# Patient Record
Sex: FEMALE | Race: WHITE | Employment: OTHER | URBAN - METROPOLITAN AREA
[De-identification: names, ages, dates, MRNs, and addresses within clinical notes are randomized per-mention and may not be internally consistent; named-entity substitution may affect disease eponyms.]

---

## 2020-10-16 ENCOUNTER — HOSPITAL ENCOUNTER (INPATIENT)
Dept: CARDIAC CATH/INVASIVE PROCEDURES | Age: 85
LOS: 6 days | Discharge: HOME | DRG: 215 | End: 2020-10-22
Attending: INTERNAL MEDICINE | Admitting: INTERNAL MEDICINE
Payer: MEDICARE

## 2020-10-16 PROBLEM — I21.4 NSTEMI (NON-ST ELEVATED MYOCARDIAL INFARCTION) (HCC): Status: ACTIVE | Noted: 2020-10-16

## 2020-10-16 LAB
HCT VFR BLD CALC: 41.1 % (ref 36–48)
HEMOGLOBIN: 13.9 G/DL (ref 12–16)
MCH RBC QN AUTO: 30.8 PG (ref 26–34)
MCHC RBC AUTO-ENTMCNC: 33.8 G/DL (ref 31–36)
MCV RBC AUTO: 91.2 FL (ref 80–100)
PDW BLD-RTO: 12.9 % (ref 12.4–15.4)
PLATELET # BLD: 180 K/UL (ref 135–450)
PMV BLD AUTO: 8.5 FL (ref 5–10.5)
RBC # BLD: 4.51 M/UL (ref 4–5.2)
WBC # BLD: 8.7 K/UL (ref 4–11)

## 2020-10-16 PROCEDURE — 6370000000 HC RX 637 (ALT 250 FOR IP): Performed by: INTERNAL MEDICINE

## 2020-10-16 PROCEDURE — 2580000003 HC RX 258: Performed by: INTERNAL MEDICINE

## 2020-10-16 PROCEDURE — 2060000000 HC ICU INTERMEDIATE R&B

## 2020-10-16 PROCEDURE — 6360000002 HC RX W HCPCS

## 2020-10-16 PROCEDURE — 2500000003 HC RX 250 WO HCPCS: Performed by: INTERNAL MEDICINE

## 2020-10-16 PROCEDURE — 2500000003 HC RX 250 WO HCPCS

## 2020-10-16 PROCEDURE — B41D1ZZ FLUOROSCOPY OF AORTA AND BILATERAL LOWER EXTREMITY ARTERIES USING LOW OSMOLAR CONTRAST: ICD-10-PCS | Performed by: INTERNAL MEDICINE

## 2020-10-16 PROCEDURE — 6360000004 HC RX CONTRAST MEDICATION: Performed by: INTERNAL MEDICINE

## 2020-10-16 PROCEDURE — 93454 CORONARY ARTERY ANGIO S&I: CPT

## 2020-10-16 PROCEDURE — B2111ZZ FLUOROSCOPY OF MULTIPLE CORONARY ARTERIES USING LOW OSMOLAR CONTRAST: ICD-10-PCS | Performed by: INTERNAL MEDICINE

## 2020-10-16 PROCEDURE — C1894 INTRO/SHEATH, NON-LASER: HCPCS

## 2020-10-16 PROCEDURE — 85027 COMPLETE CBC AUTOMATED: CPT

## 2020-10-16 PROCEDURE — B2131ZZ FLUOROSCOPY OF MULTIPLE CORONARY ARTERY BYPASS GRAFTS USING LOW OSMOLAR CONTRAST: ICD-10-PCS | Performed by: INTERNAL MEDICINE

## 2020-10-16 PROCEDURE — 6360000002 HC RX W HCPCS: Performed by: INTERNAL MEDICINE

## 2020-10-16 PROCEDURE — 2580000003 HC RX 258

## 2020-10-16 PROCEDURE — 36415 COLL VENOUS BLD VENIPUNCTURE: CPT

## 2020-10-16 PROCEDURE — 99153 MOD SED SAME PHYS/QHP EA: CPT

## 2020-10-16 PROCEDURE — C1769 GUIDE WIRE: HCPCS

## 2020-10-16 PROCEDURE — 75625 CONTRAST EXAM ABDOMINL AORTA: CPT | Performed by: INTERNAL MEDICINE

## 2020-10-16 PROCEDURE — 2709999900 HC NON-CHARGEABLE SUPPLY

## 2020-10-16 PROCEDURE — 93455 CORONARY ART/GRFT ANGIO S&I: CPT | Performed by: INTERNAL MEDICINE

## 2020-10-16 PROCEDURE — 75625 CONTRAST EXAM ABDOMINL AORTA: CPT

## 2020-10-16 PROCEDURE — 99152 MOD SED SAME PHYS/QHP 5/>YRS: CPT

## 2020-10-16 RX ORDER — ONDANSETRON 2 MG/ML
4 INJECTION INTRAMUSCULAR; INTRAVENOUS EVERY 6 HOURS PRN
Status: DISCONTINUED | OUTPATIENT
Start: 2020-10-16 | End: 2020-10-22 | Stop reason: HOSPADM

## 2020-10-16 RX ORDER — ASPIRIN 81 MG/1
81 TABLET, CHEWABLE ORAL DAILY
Status: DISCONTINUED | OUTPATIENT
Start: 2020-10-17 | End: 2020-10-22 | Stop reason: HOSPADM

## 2020-10-16 RX ORDER — SODIUM CHLORIDE 9 MG/ML
INJECTION, SOLUTION INTRAVENOUS CONTINUOUS
Status: ACTIVE | OUTPATIENT
Start: 2020-10-16 | End: 2020-10-17

## 2020-10-16 RX ORDER — ATORVASTATIN CALCIUM 80 MG/1
80 TABLET, FILM COATED ORAL NIGHTLY
Status: DISCONTINUED | OUTPATIENT
Start: 2020-10-16 | End: 2020-10-17

## 2020-10-16 RX ORDER — SODIUM CHLORIDE 0.9 % (FLUSH) 0.9 %
10 SYRINGE (ML) INJECTION PRN
Status: DISCONTINUED | OUTPATIENT
Start: 2020-10-16 | End: 2020-10-21 | Stop reason: SDUPTHER

## 2020-10-16 RX ORDER — AMLODIPINE BESYLATE 5 MG/1
5 TABLET ORAL DAILY
Status: DISCONTINUED | OUTPATIENT
Start: 2020-10-17 | End: 2020-10-22 | Stop reason: HOSPADM

## 2020-10-16 RX ORDER — LORAZEPAM 1 MG/1
1 TABLET ORAL EVERY 6 HOURS PRN
Status: DISCONTINUED | OUTPATIENT
Start: 2020-10-16 | End: 2020-10-22 | Stop reason: HOSPADM

## 2020-10-16 RX ORDER — HEPARIN SODIUM 1000 [USP'U]/ML
30 INJECTION, SOLUTION INTRAVENOUS; SUBCUTANEOUS PRN
Status: DISCONTINUED | OUTPATIENT
Start: 2020-10-16 | End: 2020-10-16

## 2020-10-16 RX ORDER — ACETAMINOPHEN 325 MG/1
650 TABLET ORAL EVERY 4 HOURS PRN
Status: DISCONTINUED | OUTPATIENT
Start: 2020-10-16 | End: 2020-10-21 | Stop reason: SDUPTHER

## 2020-10-16 RX ORDER — SODIUM CHLORIDE 0.9 % (FLUSH) 0.9 %
10 SYRINGE (ML) INJECTION EVERY 12 HOURS SCHEDULED
Status: DISCONTINUED | OUTPATIENT
Start: 2020-10-16 | End: 2020-10-21 | Stop reason: SDUPTHER

## 2020-10-16 RX ORDER — HEPARIN SODIUM 10000 [USP'U]/100ML
6 INJECTION, SOLUTION INTRAVENOUS CONTINUOUS
Status: DISCONTINUED | OUTPATIENT
Start: 2020-10-16 | End: 2020-10-17

## 2020-10-16 RX ORDER — HEPARIN SODIUM 1000 [USP'U]/ML
60 INJECTION, SOLUTION INTRAVENOUS; SUBCUTANEOUS PRN
Status: DISCONTINUED | OUTPATIENT
Start: 2020-10-16 | End: 2020-10-16

## 2020-10-16 RX ORDER — METOPROLOL SUCCINATE 25 MG/1
25 TABLET, EXTENDED RELEASE ORAL DAILY
Status: DISCONTINUED | OUTPATIENT
Start: 2020-10-16 | End: 2020-10-19

## 2020-10-16 RX ORDER — ATORVASTATIN CALCIUM 10 MG/1
10 TABLET, FILM COATED ORAL NIGHTLY
Status: DISCONTINUED | OUTPATIENT
Start: 2020-10-16 | End: 2020-10-16

## 2020-10-16 RX ADMIN — LORAZEPAM 1 MG: 1 TABLET ORAL at 22:23

## 2020-10-16 RX ADMIN — METOPROLOL SUCCINATE 25 MG: 25 TABLET, EXTENDED RELEASE ORAL at 16:54

## 2020-10-16 RX ADMIN — ATORVASTATIN CALCIUM 80 MG: 80 TABLET, FILM COATED ORAL at 22:23

## 2020-10-16 RX ADMIN — SODIUM CHLORIDE: 9 INJECTION, SOLUTION INTRAVENOUS at 16:43

## 2020-10-16 RX ADMIN — TICAGRELOR 180 MG: 90 TABLET ORAL at 16:54

## 2020-10-16 RX ADMIN — ONDANSETRON 4 MG: 2 INJECTION INTRAMUSCULAR; INTRAVENOUS at 17:53

## 2020-10-16 RX ADMIN — IOPAMIDOL 115 ML: 755 INJECTION, SOLUTION INTRAVENOUS at 16:08

## 2020-10-16 RX ADMIN — HEPARIN SODIUM 6 ML/HR: 10000 INJECTION, SOLUTION INTRAVENOUS at 16:55

## 2020-10-16 ASSESSMENT — PAIN SCALES - GENERAL: PAINLEVEL_OUTOF10: 0

## 2020-10-16 NOTE — PROGRESS NOTES
Per GRACIELA De La Paz, Dr. Katie Middleton returned call and asked to reassess site and update him at 2115.

## 2020-10-16 NOTE — PROGRESS NOTES
Clinical Pharmacy Note  Heparin Dosing Consult    Noemí Gonzalez is a 81 y.o. female ordered heparin per low dose nomogram by Dr. Katie Middleton. No results found for: APTT  No results found for: HGB, HCT, PLT, INR    Ht Readings from Last 1 Encounters:   10/16/20 5' 2\" (1.575 m)        Wt Readings from Last 1 Encounters:   10/16/20 110 lb 7.2 oz (50.1 kg)     Dosing weight: 50.1 kg    Assessment/Plan:  No Initial bolus  Initial infusion rate: 6 mL/hr  Next aPTT: 2230    Pharmacy will continue to monitor adjust heparin based on aPTT results using nomogram below:     LOW DOSE HEPARIN PROTOCOL (ACS/STEMI/A FIB)     Initial Bolus: 60 units/kg Max Bolus: 4,000 units       Initial Rate: 12 units/kg/hr Max Initial Rate: 1,000 units/hr     aPTT < 36   Heparin 60 units/kg bolus Increase infusion by 4 units/kg/hr       (maximum 4,000 units)   aPTT 37-48   Heparin 30 units/kg bolus Increase infusion by 2 units/kg/hr       (maximum 2,000 units)   aPTT 49-76   No bolus   No change   aPTT 77-85   No bolus   Decrease infusion by 2 units/kg/hr   aPTT 86-94   Hold heparin for 1 hour Decrease infusion by 3 units/kg/hr   aPTT     Hold heparin for 1 hour Decrease infusion by 4 units/kg/hr   aPTT > 103   Hold heparin for 1 hour Decrease infusion by 6 units/kg/hr    Obtain aPTT 6 hours after initial bolus and 6 hours after any dose change until two consecutive therapeutic aPTTs are achieved - then daily.   Teodoro Tejeda RPh 10/16/2020 4:24 PM

## 2020-10-16 NOTE — PROGRESS NOTES
Patient admitted to  [id number]. Admission and assessment completed and charted. VSS. NO s/s distress. Bed Alarm in place.  Light in reach

## 2020-10-16 NOTE — PROGRESS NOTES
Patients TR band complete- removed-  Patient immediately began bleeding- manual pressure held- patients left hand began to swell and significant bruising-  Pulses continued to be intact. Dr Dale Padilla on unit- in to see patient. Heparin drip held. Manual pressure continued- MD placed ace wrap - ice applied. Patient denies any pain at this time.

## 2020-10-16 NOTE — OP NOTE
[address]   Procedure Note    CLINICAL HISTORY:       Noemí Gonzalez is a 81 y.o. female with a history of CABG. She was admitted with complaints of chest pain. Cardiac markers were positive. EKG showed nonspecific ST-T wave abnormality. The risks, benefits, and details of the procedure were explained to the patient. The patient verbalized understanding and wanted to proceed. Informed written consent was obtained. Prior Procedures/Dates:  PTCA: no  CABG: yes    ETT/Stress: no  Echo: yes    Ischemia yes   Other significant clinical information:  Dye Allergy no  Coumadin no  Metformin  no  Creatinine >1.5 no     PROCEDURE TECHNIQUE:  The patient was approached from the left  radial artery using a 5-6  [place] slender sheath. Left coronary angiography was done using a Hermelinda L4 diagnostic catheter. Right coronary angiography was done using a Hermelinda R4 guide catheter. LIMA/MP/LCB catheters used for bypass grafts. Pigtail catheter used for abdominal aortogram.      CONTRAST:  Total of 115 cc. COMPLICATIONS:  None. At the end of the procedure a TR device was used for hemostasis. EBL: 5 cc    HEMODYNAMICS:  Aortic pressure was 120/80    ANGIOGRAM/CORONARY ARTERIOGRAM:       The left main coronary artery is severely calcified, with prox and distal 90% lesion . The left anterior descending artery is moderate diffuse disease. The left circumflex artery is 100% occluded     The right coronary artery is 100% occluded     SVG to PDA patent   SVG to OM1 patent   LIMA is atretic     Abdominal aortogram:    Descending aortic aneurysm  Tortuous common iliac arteries bilaterally      INTERVENTION  1.  Non     SUMMARY:   Severe LM stenosis   SVG to PDA/OM1 patent       RECOMMENDATION:      Needs unprotected left main intervention   Plan for rota/impella PCI next week ( tentatively planned for Wednesday 7:30 AM)  Will allow for creatinine to stabilize an obtain CTA chest to measure axillary artery

## 2020-10-16 NOTE — PROGRESS NOTES
Per MD - ace wrap removed after 30 minutes     Significant brusing continues from forearm throughout wrist and hand - hand remains taunt and swelling- forearm soft- pulses intact - palm refill less than 3 seconds       Ace wrap reapplied - ice packs applied- left hand and forearm placed to elevation with pillows     Call placed to Dr Tamika Jay

## 2020-10-16 NOTE — H&P
Cardiology Consultation   Referring Physician: Dr. Raine Tim   Reason for Consultation: NSTEMI   Chief Complaint: jaw pain       Subjective:   History of Present Illness:     Yohan Parekh is a 81 y.o. female with significant history of CABG, TAA repair, HTN, HL who  presents with the above complaint. Episode of severe jaw pain  2 nights ago. Sudden onset, no alleviating or exacerbating factors. Presented to OSH for workup and troponin was > 3. Currently she denies chest pain, PND, orthopnea, dyspnea at rest, palpitations, syncope or edema. Past Medical History:   has a past medical history of GERD (gastroesophageal reflux disease), Hyperlipidemia, Hypertension, and Irritable bowel syndrome. Surgical History:   has a past surgical history that includes Hysterectomy; Thoracic aortic aneurysm repair; Coronary artery bypass graft; and Cholecystectomy. Social History:   reports that she quit smoking about 24 years ago. Her smoking use included cigarettes. She has a 10.00 pack-year smoking history. She has never used smokeless tobacco. She reports current alcohol use. Family History:  family history includes Heart Disease in her father and mother; High Blood Pressure in her mother; Stroke in her mother. Home Medications:  Were reviewed and are listed in nursing record and/or below  Prior to Admission medications    Medication Sig Start Date End Date Taking? Authorizing Provider   lisinopril (PRINIVIL;ZESTRIL) 5 MG tablet Take 1 tablet by mouth daily 5/15/20  Yes Alex Claudio MD   amLODIPine (NORVASC) 5 MG tablet Take 1 tablet by mouth daily 3/28/18  Yes Alex Claudio MD   simvastatin (ZOCOR) 80 MG tablet Take 80 mg by mouth nightly. Yes Historical Provider, MD   LORazepam (ATIVAN) 1 MG tablet Take 1 mg by mouth every 6 hours as needed for Anxiety. Yes Historical Provider, MD   omeprazole (PRILOSEC) 20 MG capsule Take 40 mg by mouth daily.    Yes Historical Provider, MD clidinium-chlordiazePOXIDE (LIBRAX) 2.5-5 MG per capsule Take 1 capsule by mouth 4 times daily (before meals and nightly). Yes Historical Provider, MD   Calcium Carbonate (CALTRATE 600 PO) Take  by mouth. Yes Historical Provider, MD   Multiple Vitamins-Minerals (THERAPEUTIC MULTIVITAMIN-MINERALS) tablet Take 1 tablet by mouth daily. Yes Historical Provider, MD   aspirin 81 MG tablet Take 81 mg by mouth daily. Yes Historical Provider, MD   levobunolol (BETAGAN) 0.5 % ophthalmic solution 1 drop nightly. Yes Historical Provider, MD   fluticasone [facility]) 50 MCG/ACT nasal spray 2 sprays by Nasal route daily 2 sprays each side once/day 1/14/16   Jyotsna Vasquez MD          Allergies:  Amoxil [amoxicillin]     Review of Systems:   · Constitutional: no unanticipated weight loss. There's been no change in energy level, sleep pattern, or activity level. No fevers, chills. · Eyes: No visual changes or diplopia. No scleral icterus. · ENT: No Headaches, hearing loss or vertigo. No mouth sores or sore throat. · Cardiovascular: as reviewed in HPI  · Respiratory: No cough or wheezing, no sputum production. No hemoptysis. · Gastrointestinal: No abdominal pain, appetite loss, blood in stools. No change in bowel or bladder habits. · Genitourinary: No dysuria, trouble voiding, or hematuria. · Musculoskeletal:  No gait disturbance, no joint complaints. · Integumentary: No rash or pruritis. · Neurological: No headache, diplopia, change in muscle strength, numbness or tingling. · Psychiatric: No anxiety or depression. · Endocrine: No temperature intolerance. No excessive thirst, fluid intake, or urination. No tremor. · Hematologic/Lymphatic: No abnormal bruising or bleeding, blood clots or swollen lymph nodes. · Allergic/Immunologic: No nasal congestion or hives. Objective:   PHYSICAL EXAM:    There were no vitals filed for this visit.          General Appearance:  Alert, cooperative, no distress, appears stated age. Head:  Normocephalic, without obvious abnormality, atraumatic. Eyes:  Pupils equal and round. No scleral icterus. Mouth: Moist mucosa, no pharyngeal erythema. Nose: Nares normal. No drainage or sinus tenderness. Neck: Supple, symmetrical, trachea midline. No adenopathy. No tenderness/mass/nodules. No carotid bruit or elevated JVD. Lungs:   Clear to auscultation bilaterally, respirations unlabored. No wheeze, rales, or rhonchi. Chest Wall:  No tenderness or deformity. Heart:  Regular rate. S1/S2 normal. No murmur, rub, or gallop. Abdomen:   Soft, non-tender, bowel sounds active. Musculoskeletal: No muscle wasting or digital clubbing. Extremities: Extremities normal, atraumatic. No cyanosis or edema. Pulses: 2+ radial and carotid pulses, symmetric. Skin: No rashes or lesions. Pysch: Normal mood and affect. Alert and oriented x 4. Neurologic: Normal gross motor and sensory exam.       Labs     CBC: No results found for: WBC, RBC, HGB, HCT, MCV, RDW, PLT  CMP:No results found for: NA, K, CL, CO2, BUN, CREATININE, GFRAA, AGRATIO, LABGLOM, GLUCOSE, PROT, CALCIUM, BILITOT, ALKPHOS, AST, ALT  PT/INR:  No results found for: PTINR  HgBA1c:No results found for: LABA1C  @RESUFAST(CKTOTAL,CKMB,CKMBINDEX,TROPONINI      CURRENT Medications:  No current facility-administered medications for this encounter. Cardiac testing     EKG:     Echo:   @ [facility] normal LV function     Stress Test:     Cath: All above diagnostic testing was independently visualized and reviewed by me (not simply review of report)     Patient Active Problem List   Diagnosis    NSTEMI (non-ST elevated myocardial infarction) ([facility] [address]         Assessment and Plan   1) NSTEMI  - [facility] today   - further recommendations pending results of above     Thank you for allowing us to participate in the care of [facility].     Amando Jones MD [address] [facility],  Interventional Cardiology, and Peripheral Vascular Disease

## 2020-10-17 LAB
ANION GAP SERPL CALCULATED.3IONS-SCNC: 11 MMOL/L (ref 3–16)
BUN BLDV-MCNC: 13 MG/DL (ref 7–20)
CALCIUM SERPL-MCNC: 8.9 MG/DL (ref 8.3–10.6)
CHLORIDE BLD-SCNC: 103 MMOL/L (ref 99–110)
CO2: 24 MMOL/L (ref 21–32)
CREAT SERPL-MCNC: 0.7 MG/DL (ref 0.6–1.2)
GFR AFRICAN AMERICAN: >60
GFR NON-AFRICAN AMERICAN: >60
GLUCOSE BLD-MCNC: 96 MG/DL (ref 70–99)
HCT VFR BLD CALC: 37.2 % (ref 36–48)
HEMOGLOBIN: 12.5 G/DL (ref 12–16)
LV EF: 70 %
LVEF MODALITY: NORMAL
POTASSIUM SERPL-SCNC: 3.5 MMOL/L (ref 3.5–5.1)
SODIUM BLD-SCNC: 138 MMOL/L (ref 136–145)

## 2020-10-17 PROCEDURE — 6370000000 HC RX 637 (ALT 250 FOR IP): Performed by: INTERNAL MEDICINE

## 2020-10-17 PROCEDURE — 85014 HEMATOCRIT: CPT

## 2020-10-17 PROCEDURE — 94760 N-INVAS EAR/PLS OXIMETRY 1: CPT

## 2020-10-17 PROCEDURE — 80048 BASIC METABOLIC PNL TOTAL CA: CPT

## 2020-10-17 PROCEDURE — 2580000003 HC RX 258: Performed by: INTERNAL MEDICINE

## 2020-10-17 PROCEDURE — 85018 HEMOGLOBIN: CPT

## 2020-10-17 PROCEDURE — 2060000000 HC ICU INTERMEDIATE R&B

## 2020-10-17 PROCEDURE — 93306 TTE W/DOPPLER COMPLETE: CPT

## 2020-10-17 PROCEDURE — 99233 SBSQ HOSP IP/OBS HIGH 50: CPT | Performed by: INTERNAL MEDICINE

## 2020-10-17 PROCEDURE — 36415 COLL VENOUS BLD VENIPUNCTURE: CPT

## 2020-10-17 RX ORDER — LEVOBUNOLOL HYDROCHLORIDE 5 MG/ML
1 SOLUTION/ DROPS OPHTHALMIC NIGHTLY
Status: DISCONTINUED | OUTPATIENT
Start: 2020-10-17 | End: 2020-10-17 | Stop reason: CLARIF

## 2020-10-17 RX ORDER — TIMOLOL MALEATE 5 MG/ML
1 SOLUTION/ DROPS OPHTHALMIC 2 TIMES DAILY
Status: DISCONTINUED | OUTPATIENT
Start: 2020-10-17 | End: 2020-10-22 | Stop reason: HOSPADM

## 2020-10-17 RX ORDER — ATORVASTATIN CALCIUM 10 MG/1
10 TABLET, FILM COATED ORAL NIGHTLY
Status: DISCONTINUED | OUTPATIENT
Start: 2020-10-17 | End: 2020-10-21

## 2020-10-17 RX ORDER — CLOPIDOGREL BISULFATE 75 MG/1
75 TABLET ORAL DAILY
Status: DISCONTINUED | OUTPATIENT
Start: 2020-10-17 | End: 2020-10-22 | Stop reason: HOSPADM

## 2020-10-17 RX ADMIN — TICAGRELOR 90 MG: 90 TABLET ORAL at 09:20

## 2020-10-17 RX ADMIN — CLOPIDOGREL BISULFATE 75 MG: 75 TABLET ORAL at 18:46

## 2020-10-17 RX ADMIN — ATORVASTATIN CALCIUM 10 MG: 10 TABLET, FILM COATED ORAL at 21:04

## 2020-10-17 RX ADMIN — SODIUM CHLORIDE, PRESERVATIVE FREE 10 ML: 5 INJECTION INTRAVENOUS at 21:06

## 2020-10-17 RX ADMIN — LORAZEPAM 1 MG: 1 TABLET ORAL at 22:09

## 2020-10-17 RX ADMIN — TIMOLOL MALEATE 1 DROP: 5 SOLUTION/ DROPS OPHTHALMIC at 21:04

## 2020-10-17 RX ADMIN — METOPROLOL SUCCINATE 25 MG: 25 TABLET, EXTENDED RELEASE ORAL at 09:20

## 2020-10-17 RX ADMIN — SODIUM CHLORIDE: 9 INJECTION, SOLUTION INTRAVENOUS at 02:02

## 2020-10-17 RX ADMIN — ASPIRIN 81 MG: 81 TABLET, CHEWABLE ORAL at 09:20

## 2020-10-17 RX ADMIN — AMLODIPINE BESYLATE 5 MG: 5 TABLET ORAL at 09:20

## 2020-10-17 ASSESSMENT — PAIN SCALES - GENERAL: PAINLEVEL_OUTOF10: 0

## 2020-10-17 NOTE — PROGRESS NOTES
Left site reassessed, no significant change. Small amount of new blood on dressing. Significant bruising still present throughout wrist and hand, hand still taunt from swelling, forearm remains soft, pulses intact, palm refill less than 3 seconds, fingertip refill less than 3 seconds. Ice pack and ace wrap reapplied, elevated on 3 pillows. Called placed to Dr. Iqra Herndon.

## 2020-10-17 NOTE — PROGRESS NOTES
Cardiology progress note      HPI  60-year-old female with previous CABG, TAA repair, HTN, HLD who was admitted with severe jaw pain 2-3 nights ago to [facility]. She ruled in for non-STEMI with a troponin of 3. She was transferred here for further evaluation    Interval history  Underwent diagnostic angiography to the left radial artery  Now has a large hematoma  Hemostasis secured  But arm is swollen and bruised    No chest pain          Past Medical History:   has a past medical history of GERD (gastroesophageal reflux disease), Hyperlipidemia, Hypertension, and Irritable bowel syndrome. Surgical History:   has a past surgical history that includes Hysterectomy; Thoracic aortic aneurysm repair; Coronary artery bypass graft; and Cholecystectomy. Social History:   reports that she quit smoking about 24 years ago. Her smoking use included cigarettes. She has a 10.00 pack-year smoking history. She has never used smokeless tobacco. She reports current alcohol use. Family History:  family history includes Heart Disease in her father and mother; High Blood Pressure in her mother; Stroke in her mother. Home Medications:  Were reviewed and are listed in nursing record and/or below  Prior to Admission medications    Medication Sig Start Date End Date Taking? Authorizing Provider   lisinopril (PRINIVIL;ZESTRIL) 5 MG tablet Take 1 tablet by mouth daily 5/15/20  Yes Nura De Jesus MD   amLODIPine (NORVASC) 5 MG tablet Take 1 tablet by mouth daily 3/28/18  Yes Nura De Jesus MD   simvastatin (ZOCOR) 80 MG tablet Take 80 mg by mouth nightly. Yes Historical Provider, MD   LORazepam (ATIVAN) 1 MG tablet Take 1 mg by mouth every 6 hours as needed for Anxiety. Yes Historical Provider, MD   omeprazole (PRILOSEC) 20 MG capsule Take 40 mg by mouth daily.    Yes Historical Provider, MD   clidinium-chlordiazePOXIDE (LIBRAX) 2.5-5 MG per capsule Take 1 capsule by mouth 4 times daily (before meals and nightly). Yes Historical Provider, MD   Calcium Carbonate (CALTRATE 600 PO) Take  by mouth. Yes Historical Provider, MD   Multiple Vitamins-Minerals (THERAPEUTIC MULTIVITAMIN-MINERALS) tablet Take 1 tablet by mouth daily. Yes Historical Provider, MD   aspirin 81 MG tablet Take 81 mg by mouth daily. Yes Historical Provider, MD   levobunolol (BETAGAN) 0.5 % ophthalmic solution 1 drop nightly. Yes Historical Provider, MD   fluticasone (FLONASE) 50 MCG/ACT nasal spray 2 sprays by Nasal route daily 2 sprays each side once/day 1/14/16   Corazon Tobar MD          Allergies:  Amoxil [amoxicillin]     Objective:   PHYSICAL EXAM:    Vitals:    10/17/20 0435   BP: 121/70   Pulse: 70   Resp: 16   Temp: 97.7 °F (36.5 °C)   SpO2: 94%    Weight: 110 lb 3.7 oz (50 kg)       General Appearance:  Alert, cooperative, no distress, appears stated age. Head:  Normocephalic, without obvious abnormality, atraumatic. Eyes:  Pupils equal and round. No scleral icterus. Mouth: Moist mucosa, no pharyngeal erythema. Nose: Nares normal. No drainage or sinus tenderness. Neck: Supple, symmetrical, trachea midline. No adenopathy. No tenderness/mass/nodules. No carotid bruit or elevated JVD. Lungs:   Clear to auscultation bilaterally, respirations unlabored. No wheeze, rales, or rhonchi. Chest Wall:  No tenderness or deformity. Heart:  Regular rate. S1/S2 normal. No murmur, rub, or gallop. Abdomen:   Soft, non-tender, bowel sounds active. Musculoskeletal: No muscle wasting or digital clubbing. Extremities: Extremities normal, atraumatic. No cyanosis or edema. Pulses: 2+ radial and carotid pulses, symmetric. Skin: No rashes or lesions. Pysch: Normal mood and affect. Alert and oriented x 4.    Neurologic: Normal gross motor and sensory exam.       Labs     CBC:   Lab Results   Component Value Date    WBC 8.7 10/16/2020    RBC 4.51 10/16/2020    HGB 13.9 10/16/2020    HCT 41.1 10/16/2020    MCV 91.2 10/16/2020 RDW 12.9 10/16/2020     10/16/2020     CMP:  Lab Results   Component Value Date     10/17/2020    K 3.5 10/17/2020     10/17/2020    CO2 24 10/17/2020    BUN 13 10/17/2020    CREATININE 0.7 10/17/2020    GFRAA >60 10/17/2020    LABGLOM >60 10/17/2020    GLUCOSE 96 10/17/2020    CALCIUM 8.9 10/17/2020       Coronary angiogram: 10/16/2020    The left main coronary artery is severely calcified, with prox and distal 90% lesion . The left anterior descending artery is moderate diffuse disease. 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   Has a 90% stenosis  In addition she has an atretic LIMA graft to the LAD and left main stenosis that is greater than 60%  Dr. Joao Wells has seen her and is planning on intervention next week  On aspirin Brilinta; will switch to Plavix (although left main intervention is planned for next week)  Planing of shortness of breath; differential diagnosis is Brilinta versus diastolic dysfunction  Echo shows normal LV function  We will check proBNP      Left radial artery hematoma  Arm and hand is bruised and swollen  Pulses intact  Good perfusion  We will remove Ace wrap to decrease the swelling        Tarri Lesches M.D.

## 2020-10-17 NOTE — PROGRESS NOTES
Dr. Gentry Peñaloza instructed to wrap ace wrap a little tighter and continue with ice and elevation for the night. Ace bandage rewrapped, hand elevated and ice reapplied. Will continue to monitor.

## 2020-10-17 NOTE — PLAN OF CARE
Problem: Falls - Risk of:  Goal: Will remain free from falls  Description: Will remain free from falls  Outcome: Ongoing  Goal: Absence of physical injury  Description: Absence of physical injury  Outcome: Ongoing   Fall risk assessment completed every shift. All precautions in place. Pt has call light within reach at all times. Room clear of clutter. Pt aware to call for assistance when getting up.

## 2020-10-18 LAB
ANION GAP SERPL CALCULATED.3IONS-SCNC: 12 MMOL/L (ref 3–16)
BUN BLDV-MCNC: 15 MG/DL (ref 7–20)
CALCIUM SERPL-MCNC: 9 MG/DL (ref 8.3–10.6)
CHLORIDE BLD-SCNC: 104 MMOL/L (ref 99–110)
CO2: 24 MMOL/L (ref 21–32)
CREAT SERPL-MCNC: 0.9 MG/DL (ref 0.6–1.2)
GFR AFRICAN AMERICAN: >60
GFR NON-AFRICAN AMERICAN: 60
GLUCOSE BLD-MCNC: 104 MG/DL (ref 70–99)
HCT VFR BLD CALC: 35.3 % (ref 36–48)
HEMOGLOBIN: 12 G/DL (ref 12–16)
MCH RBC QN AUTO: 31 PG (ref 26–34)
MCHC RBC AUTO-ENTMCNC: 34.1 G/DL (ref 31–36)
MCV RBC AUTO: 90.9 FL (ref 80–100)
PDW BLD-RTO: 12.7 % (ref 12.4–15.4)
PLATELET # BLD: 164 K/UL (ref 135–450)
PMV BLD AUTO: 8.3 FL (ref 5–10.5)
POTASSIUM SERPL-SCNC: 3.3 MMOL/L (ref 3.5–5.1)
PRO-BNP: 1381 PG/ML (ref 0–449)
RBC # BLD: 3.88 M/UL (ref 4–5.2)
SODIUM BLD-SCNC: 140 MMOL/L (ref 136–145)
WBC # BLD: 6.7 K/UL (ref 4–11)

## 2020-10-18 PROCEDURE — 36415 COLL VENOUS BLD VENIPUNCTURE: CPT

## 2020-10-18 PROCEDURE — 80048 BASIC METABOLIC PNL TOTAL CA: CPT

## 2020-10-18 PROCEDURE — 2580000003 HC RX 258: Performed by: INTERNAL MEDICINE

## 2020-10-18 PROCEDURE — 94760 N-INVAS EAR/PLS OXIMETRY 1: CPT

## 2020-10-18 PROCEDURE — 2060000000 HC ICU INTERMEDIATE R&B

## 2020-10-18 PROCEDURE — 6370000000 HC RX 637 (ALT 250 FOR IP): Performed by: INTERNAL MEDICINE

## 2020-10-18 PROCEDURE — 6360000002 HC RX W HCPCS: Performed by: INTERNAL MEDICINE

## 2020-10-18 PROCEDURE — 99233 SBSQ HOSP IP/OBS HIGH 50: CPT | Performed by: INTERNAL MEDICINE

## 2020-10-18 PROCEDURE — 85027 COMPLETE CBC AUTOMATED: CPT

## 2020-10-18 PROCEDURE — 83880 ASSAY OF NATRIURETIC PEPTIDE: CPT

## 2020-10-18 RX ORDER — FUROSEMIDE 10 MG/ML
20 INJECTION INTRAMUSCULAR; INTRAVENOUS ONCE
Status: COMPLETED | OUTPATIENT
Start: 2020-10-18 | End: 2020-10-18

## 2020-10-18 RX ADMIN — TIMOLOL MALEATE 1 DROP: 5 SOLUTION/ DROPS OPHTHALMIC at 10:10

## 2020-10-18 RX ADMIN — CLOPIDOGREL BISULFATE 75 MG: 75 TABLET ORAL at 10:10

## 2020-10-18 RX ADMIN — ASPIRIN 81 MG: 81 TABLET, CHEWABLE ORAL at 10:10

## 2020-10-18 RX ADMIN — METOPROLOL SUCCINATE 25 MG: 25 TABLET, EXTENDED RELEASE ORAL at 10:10

## 2020-10-18 RX ADMIN — TIMOLOL MALEATE 1 DROP: 5 SOLUTION/ DROPS OPHTHALMIC at 20:48

## 2020-10-18 RX ADMIN — LORAZEPAM 1 MG: 1 TABLET ORAL at 22:07

## 2020-10-18 RX ADMIN — SODIUM CHLORIDE, PRESERVATIVE FREE 10 ML: 5 INJECTION INTRAVENOUS at 20:48

## 2020-10-18 RX ADMIN — AMLODIPINE BESYLATE 5 MG: 5 TABLET ORAL at 10:10

## 2020-10-18 RX ADMIN — ATORVASTATIN CALCIUM 10 MG: 10 TABLET, FILM COATED ORAL at 20:48

## 2020-10-18 RX ADMIN — SODIUM CHLORIDE, PRESERVATIVE FREE 10 ML: 5 INJECTION INTRAVENOUS at 10:10

## 2020-10-18 RX ADMIN — FUROSEMIDE 20 MG: 10 INJECTION, SOLUTION INTRAMUSCULAR; INTRAVENOUS at 13:22

## 2020-10-18 ASSESSMENT — PAIN SCALES - GENERAL
PAINLEVEL_OUTOF10: 0

## 2020-10-18 NOTE — PLAN OF CARE
Problem: Falls - Risk of:  Goal: Will remain free from falls  Description: Will remain free from falls  10/18/2020 0102 by Mallory Farah RN  Outcome: Ongoing    Goal: Absence of physical injury  Description: Absence of physical injury  10/18/2020 0102 by Malolry Farah RN  Outcome: Ongoing    Problem: Discharge Planning:  Goal: Discharged to appropriate level of care  Description: Discharged to appropriate level of care  10/18/2020 0102 by Mallory Farah RN  Outcome: Ongoing

## 2020-10-18 NOTE — CARE COORDINATION
INITIAL CASE MANAGEMENT ASSESSMENT    Reviewed chart, met with patient to assess possible discharge needs. Explained Case Management role/services. Living Situation: Lives with  of 60 yrs in a house with 1-2 GEOFF. No pets. ADLs: independent     DME: None    PT/OT Recs: TBD     Active Services: None     Transportation: Active      Medications: Not an issue    PCP:Jorge Alberto Enriquez CNP       HD/PD: N/A    PLAN/COMMENTS: Patient hopes to discharge home with no needs, open to home care if needed. ( lives in [address]. SW/CM provided contact information for patient or family to call with any questions. SW/CM will follow and assist as needed.

## 2020-10-19 ENCOUNTER — APPOINTMENT (OUTPATIENT)
Dept: CT IMAGING | Age: 85
DRG: 215 | End: 2020-10-19
Attending: INTERNAL MEDICINE
Payer: MEDICARE

## 2020-10-19 LAB
ANION GAP SERPL CALCULATED.3IONS-SCNC: 12 MMOL/L (ref 3–16)
BUN BLDV-MCNC: 20 MG/DL (ref 7–20)
CALCIUM SERPL-MCNC: 9 MG/DL (ref 8.3–10.6)
CHLORIDE BLD-SCNC: 103 MMOL/L (ref 99–110)
CO2: 24 MMOL/L (ref 21–32)
CREAT SERPL-MCNC: 0.9 MG/DL (ref 0.6–1.2)
EKG ATRIAL RATE: 138 BPM
EKG DIAGNOSIS: NORMAL
EKG Q-T INTERVAL: 334 MS
EKG QRS DURATION: 108 MS
EKG QTC CALCULATION (BAZETT): 511 MS
EKG R AXIS: -69 DEGREES
EKG T AXIS: 108 DEGREES
EKG VENTRICULAR RATE: 141 BPM
GFR AFRICAN AMERICAN: >60
GFR NON-AFRICAN AMERICAN: 60
GLUCOSE BLD-MCNC: 108 MG/DL (ref 70–99)
POTASSIUM SERPL-SCNC: 3.2 MMOL/L (ref 3.5–5.1)
SODIUM BLD-SCNC: 139 MMOL/L (ref 136–145)

## 2020-10-19 PROCEDURE — 6370000000 HC RX 637 (ALT 250 FOR IP): Performed by: INTERNAL MEDICINE

## 2020-10-19 PROCEDURE — 97530 THERAPEUTIC ACTIVITIES: CPT | Performed by: PHYSICAL THERAPIST

## 2020-10-19 PROCEDURE — 93005 ELECTROCARDIOGRAM TRACING: CPT | Performed by: INTERNAL MEDICINE

## 2020-10-19 PROCEDURE — 6360000004 HC RX CONTRAST MEDICATION: Performed by: INTERNAL MEDICINE

## 2020-10-19 PROCEDURE — 97110 THERAPEUTIC EXERCISES: CPT

## 2020-10-19 PROCEDURE — 80048 BASIC METABOLIC PNL TOTAL CA: CPT

## 2020-10-19 PROCEDURE — 36415 COLL VENOUS BLD VENIPUNCTURE: CPT

## 2020-10-19 PROCEDURE — 99232 SBSQ HOSP IP/OBS MODERATE 35: CPT | Performed by: INTERNAL MEDICINE

## 2020-10-19 PROCEDURE — 2580000003 HC RX 258: Performed by: INTERNAL MEDICINE

## 2020-10-19 PROCEDURE — 97535 SELF CARE MNGMENT TRAINING: CPT

## 2020-10-19 PROCEDURE — 2500000003 HC RX 250 WO HCPCS: Performed by: NURSE PRACTITIONER

## 2020-10-19 PROCEDURE — 94760 N-INVAS EAR/PLS OXIMETRY 1: CPT

## 2020-10-19 PROCEDURE — 2060000000 HC ICU INTERMEDIATE R&B

## 2020-10-19 PROCEDURE — 97166 OT EVAL MOD COMPLEX 45 MIN: CPT

## 2020-10-19 PROCEDURE — 97116 GAIT TRAINING THERAPY: CPT | Performed by: PHYSICAL THERAPIST

## 2020-10-19 PROCEDURE — 93010 ELECTROCARDIOGRAM REPORT: CPT | Performed by: INTERNAL MEDICINE

## 2020-10-19 PROCEDURE — 2580000003 HC RX 258: Performed by: NURSE PRACTITIONER

## 2020-10-19 PROCEDURE — 97161 PT EVAL LOW COMPLEX 20 MIN: CPT | Performed by: PHYSICAL THERAPIST

## 2020-10-19 PROCEDURE — 97530 THERAPEUTIC ACTIVITIES: CPT

## 2020-10-19 PROCEDURE — 71275 CT ANGIOGRAPHY CHEST: CPT

## 2020-10-19 RX ORDER — DILTIAZEM HYDROCHLORIDE 5 MG/ML
10 INJECTION INTRAVENOUS ONCE
Status: COMPLETED | OUTPATIENT
Start: 2020-10-19 | End: 2020-10-19

## 2020-10-19 RX ORDER — METOPROLOL SUCCINATE 50 MG/1
50 TABLET, EXTENDED RELEASE ORAL DAILY
Status: DISCONTINUED | OUTPATIENT
Start: 2020-10-20 | End: 2020-10-22

## 2020-10-19 RX ORDER — POTASSIUM CHLORIDE 20 MEQ/1
40 TABLET, EXTENDED RELEASE ORAL PRN
Status: DISCONTINUED | OUTPATIENT
Start: 2020-10-19 | End: 2020-10-22 | Stop reason: HOSPADM

## 2020-10-19 RX ORDER — POTASSIUM CHLORIDE 7.45 MG/ML
10 INJECTION INTRAVENOUS PRN
Status: DISCONTINUED | OUTPATIENT
Start: 2020-10-19 | End: 2020-10-22 | Stop reason: HOSPADM

## 2020-10-19 RX ORDER — POTASSIUM CHLORIDE 20 MEQ/1
20 TABLET, EXTENDED RELEASE ORAL ONCE
Status: DISCONTINUED | OUTPATIENT
Start: 2020-10-19 | End: 2020-10-22 | Stop reason: HOSPADM

## 2020-10-19 RX ADMIN — METOPROLOL SUCCINATE 25 MG: 25 TABLET, EXTENDED RELEASE ORAL at 08:29

## 2020-10-19 RX ADMIN — DILTIAZEM HYDROCHLORIDE 5 MG/HR: 5 INJECTION INTRAVENOUS at 02:33

## 2020-10-19 RX ADMIN — DILTIAZEM HYDROCHLORIDE 5 MG/HR: 5 INJECTION INTRAVENOUS at 05:50

## 2020-10-19 RX ADMIN — DILTIAZEM HYDROCHLORIDE 2.5 MG/HR: 5 INJECTION INTRAVENOUS at 07:03

## 2020-10-19 RX ADMIN — IOPAMIDOL 75 ML: 755 INJECTION, SOLUTION INTRAVENOUS at 08:49

## 2020-10-19 RX ADMIN — AMLODIPINE BESYLATE 5 MG: 5 TABLET ORAL at 08:29

## 2020-10-19 RX ADMIN — LORAZEPAM 1 MG: 1 TABLET ORAL at 22:07

## 2020-10-19 RX ADMIN — SODIUM CHLORIDE, PRESERVATIVE FREE 10 ML: 5 INJECTION INTRAVENOUS at 19:57

## 2020-10-19 RX ADMIN — POTASSIUM CHLORIDE 40 MEQ: 1500 TABLET, EXTENDED RELEASE ORAL at 11:24

## 2020-10-19 RX ADMIN — CLOPIDOGREL BISULFATE 75 MG: 75 TABLET ORAL at 08:29

## 2020-10-19 RX ADMIN — TIMOLOL MALEATE 1 DROP: 5 SOLUTION/ DROPS OPHTHALMIC at 19:57

## 2020-10-19 RX ADMIN — ASPIRIN 81 MG: 81 TABLET, CHEWABLE ORAL at 08:29

## 2020-10-19 RX ADMIN — ATORVASTATIN CALCIUM 10 MG: 10 TABLET, FILM COATED ORAL at 19:57

## 2020-10-19 RX ADMIN — TIMOLOL MALEATE 1 DROP: 5 SOLUTION/ DROPS OPHTHALMIC at 08:35

## 2020-10-19 RX ADMIN — DILTIAZEM HYDROCHLORIDE 10 MG: 5 INJECTION INTRAVENOUS at 02:15

## 2020-10-19 ASSESSMENT — PAIN SCALES - GENERAL
PAINLEVEL_OUTOF10: 0
PAINLEVEL_OUTOF10: 0

## 2020-10-19 NOTE — PROGRESS NOTES
Patient given 10mg bolus of cardizem. Awaiting cardizemm drip to be preapred by pharmacy.  Electronically signed by Ingrid Kim RN on 10/19/2020 at 2:24 AM

## 2020-10-19 NOTE — PROGRESS NOTES
Cmu called this RN at [facility] that patient went into A-fib and HR was 170. Went to check on Pt, and she stated she just got back in bed after using the bathroom. She is stable and is asymptomatic with the exception of her \"feeling my heart racing. \" Ekg was ordered and confirmed A-fib. Cardio was notified about this occurrence. Awaiting response for further intervention. [id number]: Paged Cardio again, and awaiting response. [id number]: Cardio called this RN back and ordered Cardizem bolus and drip.   Electronically signed by Tejas Rivera RN on 10/19/2020 at 1:58 AM

## 2020-10-19 NOTE — PROGRESS NOTES
Call placed to Dr Tiffany Wesley to discuss patient status- K level, patient NSR- awaiting call back

## 2020-10-19 NOTE — PROGRESS NOTES
Spoke to Dr Gentry De La Rosa- discussed patient status- no new orders- PT OT in to see patient. Left arm maintains brusing- left hand swelling +1/+2.  Patient does complain of discomfort in left thumb- Dr Gentry De La Rosa aware-     Will continue to monitor

## 2020-10-19 NOTE — PROGRESS NOTES
Patient went into Sinus rhythm on 12.5ml/hr cardizem. Will begin to titrate per oder to wean off.  Electronically signed by Teressa Mckeon RN on 10/19/2020 at 4:02 AM

## 2020-10-19 NOTE — PROGRESS NOTES
Occupational Therapy   Occupational Therapy Initial Assessment  Date: 10/19/2020   Patient Name: Angela Gordon  MRN: [id number]     : 1935    Date of Service: 10/19/2020    Discharge Recommendations:  Home with assist PRN  OT Equipment Recommendations  Equipment Needed: No    Angela Gordon scored a 18/24 on the -[facility] ADL Inpatient form. At this time, no further OT is recommended upon discharge due to current level of functioning and family support. Recommend patient returns to prior setting with prior services. Assessment   Performance deficits / Impairments: Decreased functional mobility ; Decreased safe awareness;Decreased balance;Decreased ADL status; Decreased endurance;Decreased strength;Decreased high-level IADLs  Assessment: 80 y/o female presented from [address] [place] [address] on 10/16/2020 with NSTEMI. Underwent diagnostic angiography to the left radial artery 10/16 now with left radial artery hematoma. Plan for [facility] 10/21 for intervention. PTA pt lives at home with spouse and was independent with ADLs and functional mobility. Today, pt with brusing in L arm up to mid bicep and edema in L thumb. Pt educated on gentle exercises to complete for hand/wrist- pt verbalized understanding. Pt completed functional mobility around room/bathroom and in max without AD and SBA. Pt reported fatigue after standing at sink for 3-4 min for grooming task. Pt is functioning slightly below baseline and will benefit form skilled therapy to prevent further decline while in hospital. Anticipate pt will be safe to return home at discharge. Prognosis: Good  Decision Making: Medium Complexity  OT Education: OT Role;Plan of Care;Home Exercise Program  REQUIRES OT FOLLOW UP: Yes  Activity Tolerance  Activity Tolerance: Patient Tolerated treatment well  Safety Devices  Safety Devices in place: Yes  Type of devices: Nurse notified;Gait belt;Call light within reach; Left in bed;Bed alarm in place           Patient Diagnosis(es): There were no encounter diagnoses. has a past medical history of GERD (gastroesophageal reflux disease), Hyperlipidemia, Hypertension, and Irritable bowel syndrome. has a past surgical history that includes Hysterectomy; Thoracic aortic aneurysm repair; Coronary artery bypass graft; and Cholecystectomy. Subjective   General  Chart Reviewed: Yes  Patient assessed for rehabilitation services?: Yes  Additional Pertinent Hx: 80 y/o female presented from [address] [place] [address] on 10/16/2020 with NSTEMI. Underwent diagnostic angiography to the left radial artery 10/16 now with left radial artery hematoma. Plan for [facility] 10/21 for intervention. Family / Caregiver Present: No  Referring Practitioner: Jenn Gongora MD  Subjective  Subjective: Pt seen bedside and agreeable to therapy. Pt reporting discomfort in L thumb  General Comment  Comments: Per RN ok for therapy.      Social/Functional History  Social/Functional History  Lives With: Spouse  Type of Home: House(ranch + basement)  Home Layout: One level(laundry main floor)  Home Access: Stairs to enter with rails  Entrance Stairs - Number of Steps: a couple steps with no rail  Bathroom Shower/Tub: Tub/Shower unit, Walk-in shower(typically uses walk in shower)  Bathroom Toilet: Standard  Home Equipment: Cane  ADL Assistance: Independent  Homemaking Assistance: Independent  Ambulation Assistance: Independent  Transfer Assistance: Independent  Active : Yes       Objective   Vision: Impaired  Vision Exceptions: Wears glasses at all times  Hearing: Within functional limits    Orientation  Overall Orientation Status: Within Functional Limits  Observation/Palpation  Observation: brusing L arm fingers to mid bicep, edema around L thumb  Balance  Sitting Balance: Stand by assistance  Standing Balance: Stand by assistance  Standing Balance  Time: 3-4 min at sink for grooming tasks  Functional Mobility  Functional Mobility Comments: around room/bathroom and short distance in max without AD and SBA     ADL  Grooming: Stand by assistance(standing at sink to brush teeth- increase effort to squeeze toothpaste with L hand but able to complete with increased time.)  LE Dressing: Minimal assistance  Additional Comments: Anticipate pt will require SBA for UB bathing/dressing, CGA for toileting based on balance observed        Bed mobility  Supine to Sit: Stand by assistance  Sit to Supine: Stand by assistance  Transfers  Sit to stand: Stand by assistance  Stand to sit: Stand by assistance     Cognition  Overall Cognitive Status: WFL        Sensation  Overall Sensation Status: WFL  Type of ROM/Therapeutic Exercise  Type of ROM/Therapeutic Exercise: AROM  Comment: Per Dr. Tyler Evans, no restrictions with L arm and asking for gentle exercises. Provided hand/wrist HEP to complete while in room and educated to keep arm/hand elevated  Exercises  Supination: x5  Pronation: x5  Wrist Flexion: x5  Wrist Extension: x5  Finger Flexion: x5  Finger Extension: x5  Grasp/Release: x5      Plan   Plan  Times per week: 3-5  Current Treatment Recommendations: Strengthening, Endurance Training, Balance Training, Gait Training, Functional Mobility Training, Self-Care / ADL, ROM, Positioning    AM-PAC Score  -[facility] Inpatient Daily Activity Raw Score: 18 (10/19/20 1444)  -PAC Inpatient ADL T-Scale Score : 38.66 (10/19/20 1444)  ADL Inpatient CMS 0-100% Score: 46.65 (10/19/20 1444)  ADL Inpatient CMS G-Code Modifier : CK (10/19/20 1444)    Goals  Short term goals  Time Frame for Short term goals: Prior to DC:   Short term goal 1: Pt will complete ADL transfers/mobility independently  Short term goal 2: Pt will complete toileting with supervision  Short term goal 3: Pt will complete LB ressing with supervision  Short term goal 4: Pt will tolerate standing > 5 min for functional task with supervision  Long term goals  Time Frame for Long term goals : stgs=ltgs  Patient Goals   Patient goals : to get stronger and return home       Therapy Time   Individual Concurrent Group Co-treatment   Time In 1300         Time Out 1353         Minutes 53         Timed Code Treatment Minutes: 45 Minutes    This note to serve as OT d/c summary if pt is d/c-ed prior to next therapy session.       Marisa Diaz, OTR/L

## 2020-10-19 NOTE — PROGRESS NOTES
[facility]  Cardiology Consult    Gabby Worrell  1935 October 19, 2020      CC: Left arm pain      Subjective:     History of Present Illness:    Gabby Worrell is a 81 y.o. patient with a PMH significant for chronic disease, coronary bypass surgery hyperlipidemia presented with complaints of chest pain. 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 Significant left main disease. 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 Past Medical History:   has a past medical history of GERD (gastroesophageal reflux disease), Hyperlipidemia, Hypertension, and Irritable bowel syndrome. Surgical History:   has a past surgical history that includes Hysterectomy; Thoracic aortic aneurysm repair; Coronary artery bypass graft; and Cholecystectomy. Social History:   reports that she quit smoking about 24 years ago. Her smoking use included cigarettes. She has a 10.00 pack-year smoking history. She has never used smokeless tobacco. She reports current alcohol use. Family History:  family history includes Heart Disease in her father and mother; High Blood Pressure in her mother; Stroke in her mother. Home Medications:  Were reviewed and are listed in nursing record and/or below  Prior to Admission medications    Medication Sig Start Date End Date Taking? Authorizing Provider   lisinopril (PRINIVIL;ZESTRIL) 5 MG tablet Take 1 tablet by mouth daily 5/15/20  Yes Miguelito Randle MD   amLODIPine (NORVASC) 5 MG tablet Take 1 tablet by mouth daily 3/28/18  Yes Miguelito Randle MD   simvastatin (ZOCOR) 80 MG tablet Take 80 mg by mouth nightly. Yes Historical Provider, MD   LORazepam (ATIVAN) 1 MG tablet Take 1 mg by mouth every 6 hours as needed for Anxiety. Yes Historical Provider, MD   omeprazole (PRILOSEC) 20 MG capsule Take 40 mg by mouth daily.    Yes Historical Provider, MD   clidinium-chlordiazePOXIDE (LIBRAX) 2.5-5 MG per capsule Take 1 capsule by mouth 4 times daily (before meals and nightly). Yes Historical Provider, MD   Calcium Carbonate (CALTRATE 600 PO) Take  by mouth. Yes Historical Provider, MD   Multiple Vitamins-Minerals (THERAPEUTIC MULTIVITAMIN-MINERALS) tablet Take 1 tablet by mouth daily. Yes Historical Provider, MD   aspirin 81 MG tablet Take 81 mg by mouth daily. Yes Historical Provider, MD   levobunolol (BETAGAN) 0.5 % ophthalmic solution Place 1 drop into the left eye 2 times daily    Yes Historical Provider, MD   fluticasone (FLONASE) 50 MCG/ACT nasal spray 2 sprays by Nasal route daily 2 sprays each side once/day 1/14/16   Guy Hargrove MD        CURRENT Medications:  dilTIAZem 125 mg in dextrose 5 % 125 mL infusion, Continuous  potassium chloride (KLOR-CON M) extended release tablet 40 mEq, PRN    Or  potassium bicarb-citric acid (EFFER-K) effervescent tablet 40 mEq, PRN    Or  potassium chloride 10 mEq/100 mL IVPB (Peripheral Line), PRN  potassium chloride (KLOR-CON M) extended release tablet 20 mEq, Once  clopidogrel (PLAVIX) tablet 75 mg, Daily  atorvastatin (LIPITOR) tablet 10 mg, Nightly  timolol (TIMOPTIC) 0.5 % ophthalmic solution 1 drop, BID  sodium chloride flush 0.9 % injection 10 mL, 2 times per day  sodium chloride flush 0.9 % injection 10 mL, PRN  acetaminophen (TYLENOL) tablet 650 mg, Q4H PRN  amLODIPine (NORVASC) tablet 5 mg, Daily  aspirin chewable tablet 81 mg, Daily  LORazepam (ATIVAN) tablet 1 mg, Q6H PRN  metoprolol succinate (TOPROL XL) extended release tablet 25 mg, Daily  perflutren lipid microspheres (DEFINITY) injection 1.65 mg, ONCE PRN  ondansetron (ZOFRAN) injection 4 mg, Q6H PRN        Allergies:  Amoxil [amoxicillin]           Review of Systems: SEE HPI   · Constitutional: no unanticipated weight loss. There's been no change in energy level, sleep pattern, or activity level. No fevers, chills. · Eyes: No visual changes or diplopia. No scleral icterus. · ENT: No Headaches, hearing loss or vertigo.  No mouth sores or sore throat. · Cardiovascular: No Chest pain, tightness or discomfort.  No Shortness of breath. No Dyspnea on exertion, Orthopnea, Paroxysmal nocturnal dyspnea or breathlessness at rest.   No Palpitations.  No Syncope ('blackouts', 'faints', 'collapse') or dizziness. · Respiratory: No cough or wheezing, no sputum production. No hematemesis. · Gastrointestinal: No abdominal pain, appetite loss, blood in stools. No change in bowel or bladder habits. · Genitourinary: No dysuria, trouble voiding, or hematuria. · Musculoskeletal:  No gait disturbance, no joint complaints. · Integumentary: No rash or pruritis. · Neurological: No headache, diplopia, change in muscle strength, numbness or tingling. · Psychiatric: No anxiety or depression. · Endocrine: No temperature intolerance. No excessive thirst, fluid intake, or urination. No tremor. · Hematologic/Lymphatic: No abnormal bruising or bleeding, blood clots or swollen lymph nodes. · Allergic/Immunologic: No nasal congestion or hives. Objective:     PHYSICAL EXAM:      Vitals:    10/19/20 1211   BP: 116/71   Pulse: 67   Resp: 18   Temp: 97.5 °F (36.4 °C)   SpO2: 98%    Weight: 109 lb 12.6 oz (49.8 kg)       General Appearance:  Alert, cooperative, no distress, appears stated age. Head:  Normocephalic, without obvious abnormality, atraumatic. Eyes:  Pupils equal and round. No scleral icterus. Mouth: Moist mucosa, no pharyngeal erythema. Nose: Nares normal. No drainage or sinus tenderness. Neck: Supple, symmetrical, trachea midline. No adenopathy. No tenderness/mass/nodules. No carotid bruit or elevated JVD. Lungs:   Respiratory Effort: Normal   Auscultation: Clear to auscultation bilaterally, respirations unlabored. No wheeze, rales   Chest Wall:  No tenderness or deformity. Cardiovascular:    Pulses  Palpation: normal   Ascultation: Regular rate, S1/ S2 normal. No murmur, rub, or gallop.   2+ radial and pedal pulses, symmetric  Carotid  Femoral   Abdomen and Gastrointestinal:   Soft, non-tender, bowel sounds active. Liver and Spleen  Masses   Musculoskeletal: No muscle wasting  Back  Gait   Extremities: Extremities normal, atraumatic. No cyanosis or edema. No cyanosis clubbing       Skin: Inspection and palpation performed, no rashes or lesions. Pysch: Normal mood and affect. Alert and oriented to time place person   Neurologic: Normal gross motor and sensory exam.       Labs     All labs have been reviewed    Lab Results   Component Value Date    WBC 6.7 10/18/2020    RBC 3.88 10/18/2020    HGB 12.0 10/18/2020    HCT 35.3 10/18/2020    MCV 90.9 10/18/2020    RDW 12.7 10/18/2020     10/18/2020     Lab Results   Component Value Date     10/19/2020    K 3.2 10/19/2020     10/19/2020    CO2 24 10/19/2020    BUN 20 10/19/2020    CREATININE 0.9 10/19/2020    GFRAA >60 10/19/2020    LABGLOM 60 10/19/2020    GLUCOSE 108 10/19/2020    CALCIUM 9.0 10/19/2020     No results found for: PTINR  No results found for: LABA1C  No results found for: CKTOTAL, CKMB, CKMBINDEX, TROPONINI    Cardiac, Vascular and Imaging Data all Personally Reviewed in Detail by Myself      EKG: Atrial fibrillation with rapid ventricular response with premature ventricular or aberrantly conducted complexesIncomplete right bundle branch blockLeft anterior fascicular blockAnteroseptal infarct , age undeterminedAbnormal ECGNo previous ECGs availableConfirmed by Teodoro Mehta MD, KIRAN ([id number]) on 10/19/2020 10:01:19 AM    Echocardiogram: LVEF 50%      Assessment and Plan     -Non-ST elevation microinfarction  Acute coronary syndrome  Unstable angina. Significant left main stenosis. Significant anomalous left circumflex stenosis. Plan is revascularization of normal left circumflex and left main coronary artery    Paroxysmal atrial fibrillation now in normal sinus rhythm. Increase beta-blocker.   We will have to evaluate her long-term candidacy for oral antithrombotic therapy. Review of CTA. Thank you for allowing us to participate in the care of [facility], [facility]. Please do not hesitate to contact me if you have any questions.     Baldev Yuan MD, MPH    [facility], [address] [place] [address]  Ph: [phone]  Fax: [phone]    10/19/2020 1:06 PM

## 2020-10-19 NOTE — PROGRESS NOTES
Physical Therapy    Facility/Department: [facility] 4V PROGRESSIVE CARE  Initial Assessment    NAME: Noemí Gonzalez  : 1935  MRN: [id number]    Date of Service: 10/19/2020    Discharge Recommendations:  Home with assist PRN   PT Equipment Recommendations  Equipment Needed: No  Noemí Gonzalez scored a  on the AM-PAC short mobility form. At this time, no further PT is recommended upon discharge. Recommend patient returns to prior setting with prior services. Assessment   Body structures, Functions, Activity limitations: Decreased functional mobility   Assessment: pt is an 82 yo female who was adm to hosp from [facility] for NSTEMI s/p angiogram with subsequent radial hematoma. Pt currently is slightly below her baseline of being Ind without an AD. Feel pt will be safe to return home at discharge with PRN assist from ; will continue to see while in hops, pt scheduled for heart cath 10-21. Prognosis: Good  Decision Making: Low Complexity  PT Education: PT Role;General Safety  Barriers to Learning: none  REQUIRES PT FOLLOW UP: Yes  Activity Tolerance  Activity Tolerance: Patient Tolerated treatment well       Patient Diagnosis(es): There were no encounter diagnoses. has a past medical history of GERD (gastroesophageal reflux disease), Hyperlipidemia, Hypertension, and Irritable bowel syndrome. has a past surgical history that includes Hysterectomy; Thoracic aortic aneurysm repair; Coronary artery bypass graft; and Cholecystectomy. Restrictions     Vision/Hearing  Vision: Impaired  Vision Exceptions: Wears glasses at all times  Hearing: Within functional limits     Subjective  General  Chart Reviewed: Yes  Patient assessed for rehabilitation services?: Yes  Additional Pertinent Hx: Noemí Gonzalez is a 81 y.o. female with significant history of CABG, TAA repair, HTN, HL who  presents with the above complaint. Episode of severe jaw pain  2 nights ago.  Sudden onset, no alleviating or exacerbating factors. Presented to OSH for workup and troponin was > 3. Currently she denies chest pain, PND, orthopnea, dyspnea at rest, palpitations, syncope or edema.  Pt adm for NSTEMI and s/p diagnostic angiogram with likely heart cath scheduled for 10-21-20  Response To Previous Treatment: Not applicable  Family / Caregiver Present: No  Subjective  Subjective: pt very pleasant and agreeable to working with therapy; pt reports some pain in her L hand/thumb s/p radial hematoma          Orientation  Orientation  Overall Orientation Status: Within Functional Limits  Social/Functional History  Social/Functional History  Lives With: Spouse  Type of Home: House(ranch + basement)  Home Layout: One level(laundry main floor)  Home Access: Stairs to enter with rails  Entrance Stairs - Number of Steps: a couple steps with no rail  Bathroom Shower/Tub: Tub/Shower unit, Walk-in shower(typically uses walk in shower)  Bathroom Toilet: Standard  Home Equipment: Cane  ADL Assistance: Independent  Homemaking Assistance: Independent  Ambulation Assistance: Independent  Transfer Assistance: Independent  Active : Yes  Cognition   Cognition  Overall Cognitive Status: WFL    Objective          AROM RLE (degrees)  RLE AROM: WFL  AROM LLE (degrees)  LLE AROM : WFL  Strength RLE  Strength RLE: WFL  Strength LLE  Strength LLE: WFL     Sensation  Overall Sensation Status: WFL  Bed mobility  Supine to Sit: Stand by assistance  Sit to Supine: Stand by assistance  Transfers  Sit to Stand: Stand by assistance  Stand to sit: Stand by assistance  Ambulation  Ambulation?: Yes  Ambulation 1  Surface: level tile  Device: No Device  Assistance: Stand by assistance  Quality of Gait: slow pace with pt being more tentative due to fear of falling  Distance: 125' and 20'x2  Comments: pt stood at sink to brush her teeth; sat in chair to don adult pull up     Balance  Comments: MI for sitting balance; SBA for standing        Plan   Plan  Times per week: 3-5  Current Treatment Recommendations: Functional Mobility Training  Safety Devices  Type of devices: Call light within reach, Bed alarm in place, Left in bed, Nurse notified    G-Code       OutComes Score                                                  AM-PAC Score  AM-PAC Inpatient Mobility Raw Score : 19 (10/19/20 1350)  AM-PAC Inpatient T-Scale Score : 45.44 (10/19/20 1350)  Mobility Inpatient CMS 0-100% Score: 41.77 (10/19/20 1350)  Mobility Inpatient CMS G-Code Modifier : CK (10/19/20 1350)          Goals  Short term goals  Time Frame for Short term goals: by discharge  Short term goal 1: transfers MI  Short term goal 2: amb 100-200 feet without AD sup  Patient Goals   Patient goals : to return home       Therapy Time   Individual Concurrent Group Co-treatment   Time In 1307         Time Out 1350         Minutes 43                 VIKTOR MARIE, PT

## 2020-10-19 NOTE — PROGRESS NOTES
Cardizem 5mg drip started at 0233. .   Electronically signed by Jaime Mancia RN on 10/19/2020 at 2:51 AM

## 2020-10-20 LAB
ANION GAP SERPL CALCULATED.3IONS-SCNC: 11 MMOL/L (ref 3–16)
BUN BLDV-MCNC: 17 MG/DL (ref 7–20)
CALCIUM SERPL-MCNC: 9 MG/DL (ref 8.3–10.6)
CHLORIDE BLD-SCNC: 102 MMOL/L (ref 99–110)
CO2: 25 MMOL/L (ref 21–32)
CREAT SERPL-MCNC: 0.9 MG/DL (ref 0.6–1.2)
EKG ATRIAL RATE: 60 BPM
EKG DIAGNOSIS: NORMAL
EKG P AXIS: -8 DEGREES
EKG P-R INTERVAL: 174 MS
EKG Q-T INTERVAL: 420 MS
EKG QRS DURATION: 100 MS
EKG QTC CALCULATION (BAZETT): 420 MS
EKG R AXIS: -59 DEGREES
EKG T AXIS: -16 DEGREES
EKG VENTRICULAR RATE: 60 BPM
GFR AFRICAN AMERICAN: >60
GFR NON-AFRICAN AMERICAN: 60
GLUCOSE BLD-MCNC: 102 MG/DL (ref 70–99)
HCT VFR BLD CALC: 36.1 % (ref 36–48)
HEMOGLOBIN: 12.4 G/DL (ref 12–16)
MCH RBC QN AUTO: 31.2 PG (ref 26–34)
MCHC RBC AUTO-ENTMCNC: 34.4 G/DL (ref 31–36)
MCV RBC AUTO: 90.9 FL (ref 80–100)
PDW BLD-RTO: 13.1 % (ref 12.4–15.4)
PLATELET # BLD: 162 K/UL (ref 135–450)
PMV BLD AUTO: 8.5 FL (ref 5–10.5)
POTASSIUM SERPL-SCNC: 3.7 MMOL/L (ref 3.5–5.1)
RBC # BLD: 3.97 M/UL (ref 4–5.2)
SODIUM BLD-SCNC: 138 MMOL/L (ref 136–145)
WBC # BLD: 6.5 K/UL (ref 4–11)

## 2020-10-20 PROCEDURE — 85027 COMPLETE CBC AUTOMATED: CPT

## 2020-10-20 PROCEDURE — 99232 SBSQ HOSP IP/OBS MODERATE 35: CPT | Performed by: INTERNAL MEDICINE

## 2020-10-20 PROCEDURE — 2060000000 HC ICU INTERMEDIATE R&B

## 2020-10-20 PROCEDURE — 94760 N-INVAS EAR/PLS OXIMETRY 1: CPT

## 2020-10-20 PROCEDURE — 6370000000 HC RX 637 (ALT 250 FOR IP): Performed by: INTERNAL MEDICINE

## 2020-10-20 PROCEDURE — 93005 ELECTROCARDIOGRAM TRACING: CPT | Performed by: INTERNAL MEDICINE

## 2020-10-20 PROCEDURE — 97110 THERAPEUTIC EXERCISES: CPT

## 2020-10-20 PROCEDURE — 80048 BASIC METABOLIC PNL TOTAL CA: CPT

## 2020-10-20 PROCEDURE — 93010 ELECTROCARDIOGRAM REPORT: CPT | Performed by: INTERNAL MEDICINE

## 2020-10-20 PROCEDURE — 36415 COLL VENOUS BLD VENIPUNCTURE: CPT

## 2020-10-20 PROCEDURE — 97535 SELF CARE MNGMENT TRAINING: CPT

## 2020-10-20 PROCEDURE — 2580000003 HC RX 258: Performed by: INTERNAL MEDICINE

## 2020-10-20 RX ORDER — SODIUM CHLORIDE 0.9 % (FLUSH) 0.9 %
10 SYRINGE (ML) INJECTION EVERY 12 HOURS SCHEDULED
Status: DISCONTINUED | OUTPATIENT
Start: 2020-10-20 | End: 2020-10-20 | Stop reason: SDUPTHER

## 2020-10-20 RX ORDER — SODIUM CHLORIDE 9 MG/ML
INJECTION, SOLUTION INTRAVENOUS CONTINUOUS
Status: DISCONTINUED | OUTPATIENT
Start: 2020-10-21 | End: 2020-10-20

## 2020-10-20 RX ORDER — SODIUM CHLORIDE 0.9 % (FLUSH) 0.9 %
10 SYRINGE (ML) INJECTION PRN
Status: DISCONTINUED | OUTPATIENT
Start: 2020-10-20 | End: 2020-10-20 | Stop reason: SDUPTHER

## 2020-10-20 RX ORDER — SODIUM CHLORIDE 9 MG/ML
INJECTION, SOLUTION INTRAVENOUS CONTINUOUS
Status: ACTIVE | OUTPATIENT
Start: 2020-10-20 | End: 2020-10-21

## 2020-10-20 RX ADMIN — TIMOLOL MALEATE 1 DROP: 5 SOLUTION/ DROPS OPHTHALMIC at 09:15

## 2020-10-20 RX ADMIN — AMLODIPINE BESYLATE 5 MG: 5 TABLET ORAL at 09:15

## 2020-10-20 RX ADMIN — LORAZEPAM 1 MG: 1 TABLET ORAL at 22:14

## 2020-10-20 RX ADMIN — ATORVASTATIN CALCIUM 10 MG: 10 TABLET, FILM COATED ORAL at 20:10

## 2020-10-20 RX ADMIN — ASPIRIN 81 MG: 81 TABLET, CHEWABLE ORAL at 09:15

## 2020-10-20 RX ADMIN — TIMOLOL MALEATE 1 DROP: 5 SOLUTION/ DROPS OPHTHALMIC at 19:49

## 2020-10-20 RX ADMIN — CLOPIDOGREL BISULFATE 75 MG: 75 TABLET ORAL at 09:15

## 2020-10-20 RX ADMIN — SODIUM CHLORIDE, PRESERVATIVE FREE 10 ML: 5 INJECTION INTRAVENOUS at 09:16

## 2020-10-20 RX ADMIN — SODIUM CHLORIDE, PRESERVATIVE FREE 10 ML: 5 INJECTION INTRAVENOUS at 20:08

## 2020-10-20 RX ADMIN — METOPROLOL SUCCINATE 50 MG: 50 TABLET, EXTENDED RELEASE ORAL at 09:15

## 2020-10-20 ASSESSMENT — PAIN SCALES - GENERAL
PAINLEVEL_OUTOF10: 0
PAINLEVEL_OUTOF10: 0

## 2020-10-20 NOTE — PLAN OF CARE
Problem: Falls - Risk of:  Goal: Will remain free from falls  Description: Will remain free from falls  Outcome: Ongoing   Patient free from falls. No s/s distress.  Light in reach     Problem: Discharge Planning:  Goal: Discharged to appropriate level of care  Description: Discharged to appropriate level of care  Outcome: Ongoing

## 2020-10-20 NOTE — PLAN OF CARE
Problem: Falls - Risk of:  Goal: Will remain free from falls  Description: Will remain free from falls  10/19/2020 2306 by Giselle Barajas RN  Outcome: Ongoing  Note: Fall risk assessment completed every shift. All precautions in place. Pt has call light within reach at all times. Room clear of clutter. Pt aware to call for assistance when getting up. Patient assessed for fall risk; fall precautions initiated. Patient and family instructed about safety devices. Environment kept free of clutter and adequate lighting provided. Bed locked and in lowest position. Call light within reach. Will continue to monitor.    10/19/2020 1316 by González Novoa RN  Outcome: Ongoing     Problem: Discharge Planning:  Goal: Discharged to appropriate level of care  Description: Discharged to appropriate level of care  10/19/2020 2306 by Giselle Barajas RN  Outcome: Ongoing  10/19/2020 1316 by González Novoa RN  Outcome: Ongoing     Problem: Bleeding:  Goal: Will show no signs and symptoms of excessive bleeding  Description: Will show no signs and symptoms of excessive bleeding  10/19/2020 2306 by Giselle Barajas RN  Outcome: Ongoing  10/19/2020 1316 by González Novoa RN  Outcome: Ongoing

## 2020-10-20 NOTE — PROGRESS NOTES
Patient signed consent for tomorrows procedure- informed of npo status- awaiting time of procedure     Dressing on left wrist from previous cath changed 10/19-  Small bloody drainage on dressing- area marked-    Awaiting dr Lizeth Bowman to round to discuss

## 2020-10-20 NOTE — PROGRESS NOTES
Occupational Therapy  Facility/Department: [facility] 5W PROGRESSIVE CARE  Daily Treatment Note  NAME: Tania Duenas  : 1935  MRN: [id number]    Date of Service: 10/20/2020    Discharge Recommendations:  Home with assist PRN  OT Equipment Recommendations  Equipment Needed: No    Tania Duenas scored a 19/24 on the [facility] ADL Inpatient form. At this time, no further OT is recommended upon discharge due to family support and current level of functioning. Recommend patient returns to prior setting with prior services. Assessment   Performance deficits / Impairments: Decreased functional mobility ; Decreased safe awareness;Decreased balance;Decreased ADL status; Decreased endurance;Decreased strength;Decreased high-level IADLs  Assessment: 80 y/o female presented from [address] [place] [address] on 10/16/2020 with NSTEMI. Underwent diagnostic angiography to the left radial artery 10/16 now with left radial artery hematoma. Plan for [address] [place] [address] 10/21 for intervention. PTA pt lives at home with spouse and was independent with ADLs and functional mobility. Today, pt with brusing in L arm up to mid bicep and slight edema in L thumb (improved from yesterday). Pt with good tolerance to UE exercises for hand/wrist and is less guarded with L hand compared to yesterday. Pt completed functional mobility around room/bathroom without AD and CGA initially but improved to SBA. Pt reported fatigue after standing at sink for 3-4 min for grooming task. Pt is functioning slightly below baseline and will benefit form skilled therapy to prevent further decline while in hospital. Anticipate pt will be safe to return home at discharge. Prognosis: Good  OT Education: OT Role;Plan of Care  REQUIRES OT FOLLOW UP: Yes  Activity Tolerance  Activity Tolerance: Patient Tolerated treatment well  Safety Devices  Safety Devices in place: Yes  Type of devices: Nurse notified;Gait belt;Call light within reach; Left in chair;Chair alarm in place         Patient Diagnosis(es): There were no encounter diagnoses. has a past medical history of CAD (coronary artery disease), GERD (gastroesophageal reflux disease), Hyperlipidemia, and Hypertension. has a past surgical history that includes Hysterectomy; Thoracic aortic aneurysm repair; Coronary artery bypass graft (12/2000); and Cholecystectomy. Restrictions  Restrictions/Precautions  Restrictions/Precautions: Fall Risk     Subjective   General  Chart Reviewed: Yes  Patient assessed for rehabilitation services?: Yes  Additional Pertinent Hx: 82 y/o female presented from [address] [place] [address] on 10/16/2020 with NSTEMI. Underwent diagnostic angiography to the left radial artery 10/16 now with left radial artery hematoma. Plan for [address] [place] [address] 10/21 for intervention. Response to previous treatment: Patient with no complaints from previous session  Family / Caregiver Present: No  Referring Practitioner: Anita Cabrera MD  Subjective  Subjective: Pt seen bedside and agreeable to therapy. General Comment  Comments: Per RN ok for therapy.       Objective    ADL  Grooming: Stand by assistance(standing at sink to wash hands and brush teeth- fatigues easily)  LE Dressing: Minimal assistance(assist to jayant clean depends seated on toilet)  Toileting: Stand by assistance(able to manage clothes up/down and annel care)        Balance  Sitting Balance: Stand by assistance  Standing Balance: Stand by assistance  Standing Balance  Time: 3-4 min at sink for grooming tasks  Comment: reports fatigue with standing  Functional Mobility  Functional Mobility Comments: pt ambulated around room/bathroom without AD- initially CGA for the first few steps but improved to SBA  Toilet Transfers  Toilet - Technique: Ambulating  Equipment Used: Standard toilet  Toilet Transfer: Stand by assistance  Toilet Transfers Comments: grab bar support     Bed mobility  Supine to Sit: Supervision(HOB elevated)  Sit to Supine: (pt in chair at end of session, alarm activated) Transfers  Sit to stand: Stand by assistance  Stand to sit: Stand by assistance        Cognition  Overall Cognitive Status: WFL     Type of ROM/Therapeutic Exercise  Type of ROM/Therapeutic Exercise: AROM  Exercises  Elbow Flexion: x5  Elbow Extension: x5  Supination: x5  Pronation: x5  Wrist Flexion: x5  Wrist Extension: x5  Finger Flexion: x5  Finger Extension: x5  Other: thumb- digit opposition       Plan   Plan  Times per week: 3-5  Current Treatment Recommendations: Strengthening, Endurance Training, Balance Training, Gait Training, Functional Mobility Training, Self-Care / ADL, ROM, Positioning    AM-PAC Score     AM-[facility] Inpatient Daily Activity Raw Score: 18 (10/19/20 1444)  AM-PAC Inpatient ADL T-Scale Score : 38.66 (10/19/20 1444)  ADL Inpatient CMS 0-100% Score: 46.65 (10/19/20 1444)  ADL Inpatient CMS G-Code Modifier : CK (10/19/20 1444)    Goals  Short term goals  Time Frame for Short term goals: Prior to DC: goals ongoing  Short term goal 1: Pt will complete ADL transfers/mobility independently  Short term goal 2: Pt will complete toileting with supervision  Short term goal 3: Pt will complete LB ressing with supervision  Short term goal 4: Pt will tolerate standing > 5 min for functional task with supervision  Long term goals  Time Frame for Long term goals : stgs=ltgs  Patient Goals   Patient goals : to get stronger and return home       Therapy Time   Individual Concurrent Group Co-treatment   Time In 1415         Time Out 1442         Minutes 27         Timed Code Treatment Minutes: 27 Minutes     This note to serve as OT d/c summary if pt is d/c-ed prior to next therapy session.     Evelin Santos OTR/L

## 2020-10-20 NOTE — PROGRESS NOTES
[address]  Cardiology Consult    Kar Musa  1935 October 20, 2020      CC: Left arm pain      Subjective:     History of Present Illness:    Kar Musa is a 81 y.o. patient with a PMH significant for chronic disease, coronary bypass surgery hyperlipidemia presented with complaints of chest pain. 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 Significant left main disease. 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 Past Medical History:   has a past medical history of CAD (coronary artery disease), GERD (gastroesophageal reflux disease), Hyperlipidemia, and Hypertension. Surgical History:   has a past surgical history that includes Hysterectomy; Thoracic aortic aneurysm repair; Coronary artery bypass graft (12/2000); and Cholecystectomy. Social History:   reports that she quit smoking about 24 years ago. Her smoking use included cigarettes. She has a 10.00 pack-year smoking history. She has never used smokeless tobacco. She reports current alcohol use. Family History:  family history includes Heart Disease in her father and mother; High Blood Pressure in her mother; Stroke in her mother. Home Medications:  Were reviewed and are listed in nursing record and/or below  Prior to Admission medications    Medication Sig Start Date End Date Taking? Authorizing Provider   lisinopril (PRINIVIL;ZESTRIL) 5 MG tablet Take 1 tablet by mouth daily 5/15/20  Yes Dewight Severin, MD   amLODIPine (NORVASC) 5 MG tablet Take 1 tablet by mouth daily 3/28/18  Yes Dewight Severin, MD   simvastatin (ZOCOR) 80 MG tablet Take 80 mg by mouth nightly. Yes Historical Provider, MD   LORazepam (ATIVAN) 1 MG tablet Take 1 mg by mouth every 6 hours as needed for Anxiety. Yes Historical Provider, MD   omeprazole (PRILOSEC) 20 MG capsule Take 40 mg by mouth daily.    Yes Historical Provider, MD   clidinium-chlordiazePOXIDE (LIBRAX) 2.5-5 MG per capsule Take 1 capsule by mouth 4 times mouth sores or sore throat. · Cardiovascular: No Chest pain, tightness or discomfort.  No Shortness of breath. No Dyspnea on exertion, Orthopnea, Paroxysmal nocturnal dyspnea or breathlessness at rest.   No Palpitations.  No Syncope ('blackouts', 'faints', 'collapse') or dizziness. · Respiratory: No cough or wheezing, no sputum production. No hematemesis. · Gastrointestinal: No abdominal pain, appetite loss, blood in stools. No change in bowel or bladder habits. · Genitourinary: No dysuria, trouble voiding, or hematuria. · Musculoskeletal:  No gait disturbance, no joint complaints. · Integumentary: No rash or pruritis. · Neurological: No headache, diplopia, change in muscle strength, numbness or tingling. · Psychiatric: No anxiety or depression. · Endocrine: No temperature intolerance. No excessive thirst, fluid intake, or urination. No tremor. · Hematologic/Lymphatic: No abnormal bruising or bleeding, blood clots or swollen lymph nodes. · Allergic/Immunologic: No nasal congestion or hives. Objective:     PHYSICAL EXAM:      Vitals:    10/20/20 1145   BP: 119/67   Pulse: 59   Resp: 16   Temp: 98 °F (36.7 °C)   SpO2: 93%    Weight: 109 lb 9.1 oz (49.7 kg)       General Appearance:  Alert, cooperative, no distress, appears stated age. Head:  Normocephalic, without obvious abnormality, atraumatic. Eyes:  Pupils equal and round. No scleral icterus. Mouth: Moist mucosa, no pharyngeal erythema. Nose: Nares normal. No drainage or sinus tenderness. Neck: Supple, symmetrical, trachea midline. No adenopathy. No tenderness/mass/nodules. No carotid bruit or elevated JVD. Lungs:   Respiratory Effort: Normal   Auscultation: Clear to auscultation bilaterally, respirations unlabored. No wheeze, rales   Chest Wall:  No tenderness or deformity. Cardiovascular:    Pulses  Palpation: normal   Ascultation: Regular rate, S1/ S2 normal. No murmur, rub, or gallop.   2+ radial and pedal pulses, symmetric  Carotid  Femoral   Abdomen and Gastrointestinal:   Soft, non-tender, bowel sounds active. Liver and Spleen  Masses   Musculoskeletal: No muscle wasting  Back  Gait   Extremities: Extremities normal, atraumatic. No cyanosis or edema. No cyanosis clubbing       Skin: Inspection and palpation performed, no rashes or lesions. Pysch: Normal mood and affect. Alert and oriented to time place person   Neurologic: Normal gross motor and sensory exam.       Labs     All labs have been reviewed    Lab Results   Component Value Date    WBC 6.5 10/20/2020    RBC 3.97 10/20/2020    HGB 12.4 10/20/2020    HCT 36.1 10/20/2020    MCV 90.9 10/20/2020    RDW 13.1 10/20/2020     10/20/2020     Lab Results   Component Value Date     10/20/2020    K 3.7 10/20/2020     10/20/2020    CO2 25 10/20/2020    BUN 17 10/20/2020    CREATININE 0.9 10/20/2020    GFRAA >60 10/20/2020    LABGLOM 60 10/20/2020    GLUCOSE 102 10/20/2020    CALCIUM 9.0 10/20/2020     No results found for: PTINR  No results found for: LABA1C  No results found for: CKTOTAL, CKMB, CKMBINDEX, TROPONINI    Cardiac, Vascular and Imaging Data all Personally Reviewed in Detail by Myself      EKG: Atrial fibrillation with rapid ventricular response with premature ventricular or aberrantly conducted complexesIncomplete right bundle branch blockLeft anterior fascicular blockAnteroseptal infarct , age undeterminedAbnormal ECGNo previous ECGs availableConfirmed by Larry Fisher MD, KIRAN ([id number]) on 10/19/2020 10:01:19 AM    Echocardiogram: LVEF 50%      Assessment and Plan     -Non-ST elevation microinfarction  Acute coronary syndrome  Unstable angina. Significant left main stenosis. Significant anomalous left circumflex stenosis. Plan is revascularization of normal left circumflex and left main coronary artery    Paroxysmal atrial fibrillation now in normal sinus rhythm. Increase beta-blocker.   We will have to evaluate her long-term candidacy for

## 2020-10-21 ENCOUNTER — APPOINTMENT (OUTPATIENT)
Dept: CARDIAC CATH/INVASIVE PROCEDURES | Age: 85
DRG: 215 | End: 2020-10-21
Attending: INTERNAL MEDICINE
Payer: MEDICARE

## 2020-10-21 LAB
ANION GAP SERPL CALCULATED.3IONS-SCNC: 11 MMOL/L (ref 3–16)
BUN BLDV-MCNC: 20 MG/DL (ref 7–20)
CALCIUM SERPL-MCNC: 9 MG/DL (ref 8.3–10.6)
CHLORIDE BLD-SCNC: 100 MMOL/L (ref 99–110)
CO2: 24 MMOL/L (ref 21–32)
CREAT SERPL-MCNC: 0.8 MG/DL (ref 0.6–1.2)
EKG ATRIAL RATE: 74 BPM
EKG DIAGNOSIS: NORMAL
EKG P AXIS: 14 DEGREES
EKG P-R INTERVAL: 172 MS
EKG Q-T INTERVAL: 414 MS
EKG QRS DURATION: 112 MS
EKG QTC CALCULATION (BAZETT): 459 MS
EKG R AXIS: -72 DEGREES
EKG T AXIS: -4 DEGREES
EKG VENTRICULAR RATE: 74 BPM
GFR AFRICAN AMERICAN: >60
GFR NON-AFRICAN AMERICAN: >60
GLUCOSE BLD-MCNC: 100 MG/DL (ref 70–99)
POC ACT LR: 198 SEC
POC ACT LR: 266 SEC
POC ACT LR: 281 SEC
POTASSIUM SERPL-SCNC: 3.7 MMOL/L (ref 3.5–5.1)
SODIUM BLD-SCNC: 135 MMOL/L (ref 136–145)

## 2020-10-21 PROCEDURE — C1894 INTRO/SHEATH, NON-LASER: HCPCS

## 2020-10-21 PROCEDURE — C1753 CATH, INTRAVAS ULTRASOUND: HCPCS

## 2020-10-21 PROCEDURE — 02HA3RJ INSERTION OF SHORT-TERM EXTERNAL HEART ASSIST SYSTEM INTO HEART, INTRAOPERATIVE, PERCUTANEOUS APPROACH: ICD-10-PCS | Performed by: INTERNAL MEDICINE

## 2020-10-21 PROCEDURE — 6360000004 HC RX CONTRAST MEDICATION: Performed by: INTERNAL MEDICINE

## 2020-10-21 PROCEDURE — C1724 CATH, TRANS ATHEREC,ROTATION: HCPCS

## 2020-10-21 PROCEDURE — 92978 ENDOLUMINL IVUS OCT C 1ST: CPT | Performed by: INTERNAL MEDICINE

## 2020-10-21 PROCEDURE — 85347 COAGULATION TIME ACTIVATED: CPT

## 2020-10-21 PROCEDURE — C1887 CATHETER, GUIDING: HCPCS

## 2020-10-21 PROCEDURE — 6370000000 HC RX 637 (ALT 250 FOR IP): Performed by: INTERNAL MEDICINE

## 2020-10-21 PROCEDURE — C1874 STENT, COATED/COV W/DEL SYS: HCPCS

## 2020-10-21 PROCEDURE — 5A0221D ASSISTANCE WITH CARDIAC OUTPUT USING IMPELLER PUMP, CONTINUOUS: ICD-10-PCS | Performed by: INTERNAL MEDICINE

## 2020-10-21 PROCEDURE — C9602 PERC D-E COR STENT ATHER S: HCPCS

## 2020-10-21 PROCEDURE — C1725 CATH, TRANSLUMIN NON-LASER: HCPCS

## 2020-10-21 PROCEDURE — 80048 BASIC METABOLIC PNL TOTAL CA: CPT

## 2020-10-21 PROCEDURE — 92978 ENDOLUMINL IVUS OCT C 1ST: CPT

## 2020-10-21 PROCEDURE — 2500000003 HC RX 250 WO HCPCS

## 2020-10-21 PROCEDURE — 2709999900 HC NON-CHARGEABLE SUPPLY

## 2020-10-21 PROCEDURE — C1769 GUIDE WIRE: HCPCS

## 2020-10-21 PROCEDURE — 36415 COLL VENOUS BLD VENIPUNCTURE: CPT

## 2020-10-21 PROCEDURE — C1760 CLOSURE DEV, VASC: HCPCS

## 2020-10-21 PROCEDURE — 33990 INSJ PERQ VAD L HRT ARTERIAL: CPT | Performed by: INTERNAL MEDICINE

## 2020-10-21 PROCEDURE — 2720000010 HC SURG SUPPLY STERILE

## 2020-10-21 PROCEDURE — 2100000000 HC CCU R&B

## 2020-10-21 PROCEDURE — X2C0361 EXTIRPATION OF MATTER FROM CORONARY ARTERY, ONE ARTERY USING ORBITAL ATHERECTOMY TECHNOLOGY, PERCUTANEOUS APPROACH, NEW TECHNOLOGY GROUP 1: ICD-10-PCS | Performed by: INTERNAL MEDICINE

## 2020-10-21 PROCEDURE — 6360000002 HC RX W HCPCS

## 2020-10-21 PROCEDURE — 2580000003 HC RX 258: Performed by: INTERNAL MEDICINE

## 2020-10-21 PROCEDURE — 6370000000 HC RX 637 (ALT 250 FOR IP)

## 2020-10-21 PROCEDURE — 99153 MOD SED SAME PHYS/QHP EA: CPT

## 2020-10-21 PROCEDURE — 93005 ELECTROCARDIOGRAM TRACING: CPT | Performed by: INTERNAL MEDICINE

## 2020-10-21 PROCEDURE — 92933 PRQ TRLML C ATHRC ST ANGIOP1: CPT | Performed by: INTERNAL MEDICINE

## 2020-10-21 PROCEDURE — 027035Z DILATION OF CORONARY ARTERY, ONE ARTERY WITH TWO DRUG-ELUTING INTRALUMINAL DEVICES, PERCUTANEOUS APPROACH: ICD-10-PCS | Performed by: INTERNAL MEDICINE

## 2020-10-21 PROCEDURE — 36140 INTRO NDL ICATH UPR/LXTR ART: CPT

## 2020-10-21 PROCEDURE — 33990 INSJ PERQ VAD L HRT ARTERIAL: CPT

## 2020-10-21 PROCEDURE — 36216 PLACE CATHETER IN ARTERY: CPT

## 2020-10-21 PROCEDURE — 92979 ENDOLUMINL IVUS OCT C EA: CPT

## 2020-10-21 PROCEDURE — 99152 MOD SED SAME PHYS/QHP 5/>YRS: CPT

## 2020-10-21 PROCEDURE — 93010 ELECTROCARDIOGRAM REPORT: CPT | Performed by: INTERNAL MEDICINE

## 2020-10-21 RX ORDER — ACETAMINOPHEN 325 MG/1
650 TABLET ORAL EVERY 4 HOURS PRN
Status: DISCONTINUED | OUTPATIENT
Start: 2020-10-21 | End: 2020-10-22 | Stop reason: HOSPADM

## 2020-10-21 RX ORDER — SODIUM CHLORIDE 0.9 % (FLUSH) 0.9 %
10 SYRINGE (ML) INJECTION PRN
Status: DISCONTINUED | OUTPATIENT
Start: 2020-10-21 | End: 2020-10-22 | Stop reason: HOSPADM

## 2020-10-21 RX ORDER — SODIUM CHLORIDE 0.9 % (FLUSH) 0.9 %
10 SYRINGE (ML) INJECTION EVERY 12 HOURS SCHEDULED
Status: DISCONTINUED | OUTPATIENT
Start: 2020-10-21 | End: 2020-10-22 | Stop reason: HOSPADM

## 2020-10-21 RX ORDER — ATORVASTATIN CALCIUM 80 MG/1
80 TABLET, FILM COATED ORAL NIGHTLY
Status: DISCONTINUED | OUTPATIENT
Start: 2020-10-21 | End: 2020-10-22 | Stop reason: HOSPADM

## 2020-10-21 RX ADMIN — Medication 10 ML: at 19:40

## 2020-10-21 RX ADMIN — TIMOLOL MALEATE 1 DROP: 5 SOLUTION/ DROPS OPHTHALMIC at 19:40

## 2020-10-21 RX ADMIN — ASPIRIN 81 MG: 81 TABLET, CHEWABLE ORAL at 12:44

## 2020-10-21 RX ADMIN — SODIUM CHLORIDE: 9 INJECTION, SOLUTION INTRAVENOUS at 06:23

## 2020-10-21 RX ADMIN — AMLODIPINE BESYLATE 5 MG: 5 TABLET ORAL at 12:44

## 2020-10-21 RX ADMIN — METOPROLOL SUCCINATE 50 MG: 50 TABLET, EXTENDED RELEASE ORAL at 12:44

## 2020-10-21 RX ADMIN — LORAZEPAM 1 MG: 1 TABLET ORAL at 21:41

## 2020-10-21 RX ADMIN — IOPAMIDOL 200 ML: 755 INJECTION, SOLUTION INTRAVENOUS at 12:43

## 2020-10-21 RX ADMIN — ATORVASTATIN CALCIUM 80 MG: 80 TABLET, FILM COATED ORAL at 19:40

## 2020-10-21 ASSESSMENT — PAIN SCALES - GENERAL
PAINLEVEL_OUTOF10: 0

## 2020-10-21 NOTE — PLAN OF CARE
Problem: Falls - Risk of:  Goal: Will remain free from falls  Description: Will remain free from falls  10/21/2020 0020 by Jack Santiago RN  Outcome: Ongoing  Note: Fall risk assessment completed every shift. All precautions in place. Pt has call light within reach at all times. Room clear of clutter. Pt aware to call for assistance when getting up.    10/20/2020 1509 by Catrachita Lewis RN  Outcome: Ongoing     Problem: Discharge Planning:  Goal: Discharged to appropriate level of care  Description: Discharged to appropriate level of care  10/20/2020 1509 by Catrachita Lewis RN  Outcome: Ongoing     Problem: Bleeding:  Goal: Will show no signs and symptoms of excessive bleeding  Description: Will show no signs and symptoms of excessive bleeding  10/21/2020 0021 by Jack Santiago RN  Outcome: Ongoing  10/21/2020 0020 by Jack Santiago RN  Outcome: Ongoing     Problem: DAILY CARE  Goal: Daily care needs are met  Outcome: Ongoing  Note: Patient's ability assessed to perform self care and independent activity encouraged according to that ability. Assisted with ADL's as needed. Risk for skin breakdown assessed. Potential discharge needs assessed. Patient and family included in daily care decisions.       Problem: SKIN INTEGRITY  Goal: Skin integrity is maintained or improved  Outcome: Ongoing

## 2020-10-21 NOTE — OP NOTE
[address]   Procedure Note    CLINICAL HISTORY:       Mere Talavera is a 81 y.o. female with a history of hypertension. She was admitted with complaints of chest pain. Cardiac markers were positive. EKG showed nonspecific ST-T wave abnormality. The risks, benefits, and details of the procedure were explained to the patient. The patient verbalized understanding and wanted to proceed. Informed written consent was obtained. Prior Procedures/Dates:  PTCA: no  CABG: yes    ETT/Stress: no  Echo: yes    Ischemia yes    Other significant clinical information:  Dye Allergy no  Coumadin no  Metformin  no  Creatinine >1.5 no     PROCEDURE TECHNIQUE:  The patient was approached from the right femoral artery using a 6  Kinyarwanda  sheath. Impella CP percuatneous LVAD was used for continuous cardiac output and hemodynamic support this was positioned into the right axillary artery. CONTRAST:  Total of 180 cc. COMPLICATIONS:  None. At the end of the procedure a perclose device was used for hemostasis. EBL: 50 cc    HEMODYNAMICS:  Aortic pressure was 14/80    ANGIOGRAM/CORONARY ARTERIOGRAM:       The left main coronary artery has a 90% stenosis . 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 INTERVENTION  1. Using a JR diagnostic catheter from the femoral approach a right axillary angiogram was performed   2. Using fluro/US/micropuncture technique the 2nd portion of the axillary artery was accessed, 2 precloses were deployed   3. Impella CP percutaneous LVAD was positioned into the LV apex without complication   4. Xb3.5 guide  5. 300 runthrough wire used to cross LM/LAD lesion   6. Exchanged for a rotafloppy wire  7. Rotational atherectomy as performed of the left main and proximal LAD using a 1.5mm ashish X 2 passes   8. Predilation was performed using a 2.5/3.0 regular balloon  9.  IVUS catheter was passed to the LAD ( distal reference diameter 4mm)   10. [place] 3.0 X 30 mm CARROL deployed to the prox LAD   11. [place] 3.5 X 26 mm CARROL deployed to the prox LAD and left main   12. Post-dilation was performed with 3.0/4.0 NC balloons to 15 SHANTEL   13. Anomlaous LCx was wired using a fielder XT however no balloon would cross   14. Impella CP was successfully weaned and explanted in the CCL   15. A 6.0 X 20 mm balloon was positioned into the axillary artery for dry closure   16. 2 perclose deployed with successful hemostasis w/ post deployment angiogram without complication     SUMMARY:   -PercAx Impella CP placement and successful explant  - S/p successful atherectomy and IVUS guided PCI prox LAD and left main X 2 CARROL       RECOMMENDATION:      1. Recommend beta blockade, high potency statin, aspirin and plavix for 12 months  2. Referral to cardiac rehab placed  3. Follow up in 1-2 weeks with cardiology  4.  Likely d/c home in      Andrew Maier MD [address] [facility], Interventional Cardiology, and Peripheral Vascular [address] [facility]   (C): [phone]  Ragini Tajik): [phone]

## 2020-10-21 NOTE — PROGRESS NOTES
Physical Therapy Attempt    Attempted to see patient for therapy. Pt off the floor at cath lab. Will need new orders after cath.     Isai Thakkar,   [facility]

## 2020-10-21 NOTE — PRE SEDATION
Sedation Pre-Procedure Note    Patient Name: Keesha Collins   YOB: 1935  Room/Bed: CATH/NONE  Medical Record Number: [id number]  Date: 10/21/2020   Time: 10:06 AM       Indication:  NSTEMI    Consent: I have discussed with the patient and/or the patient representative the indication, alternatives, and the possible risks and/or complications of the planned procedure and the anesthesia methods. The patient and/or patient representative appear to understand and agree to proceed. Vital Signs:   Vitals:    10/21/20 0406   BP: 137/78   Pulse: 76   Resp: 18   Temp: 97.2 °F (36.2 °C)   SpO2: 95%       Past Medical History:   has a past medical history of CAD (coronary artery disease), GERD (gastroesophageal reflux disease), Hyperlipidemia, Hypertension, PAF (paroxysmal atrial fibrillation) ([facility] [address] S/P AAA repair, and Thoracic aortic aneurysm ([facility] [address] Past Surgical History:   has a past surgical history that includes Hysterectomy; Thoracic aortic aneurysm repair; Coronary artery bypass graft (12/2000); and Cholecystectomy. Medications:   Scheduled Meds:    potassium chloride  20 mEq Oral Once    metoprolol succinate  50 mg Oral Daily    clopidogrel  75 mg Oral Daily    atorvastatin  10 mg Oral Nightly    timolol  1 drop Left Eye BID    sodium chloride flush  10 mL Intravenous 2 times per day    amLODIPine  5 mg Oral Daily    aspirin  81 mg Oral Daily     Continuous Infusions:    sodium chloride 50 mL/hr at 10/21/20 0623     PRN Meds: potassium chloride **OR** potassium alternative oral replacement **OR** potassium chloride, sodium chloride flush, acetaminophen, LORazepam, perflutren lipid microspheres, ondansetron  Home Meds:   Prior to Admission medications    Medication Sig Start Date End Date Taking?  Authorizing Provider   lisinopril (PRINIVIL;ZESTRIL) 5 MG tablet Take 1 tablet by mouth daily 5/15/20  Yes Karly Sin MD   amLODIPine (NORVASC) 5 MG tablet Take 1 tablet by mouth daily

## 2020-10-21 NOTE — PROGRESS NOTES
1015: patient arrived to [place] [address] room 1312. Attcahed to CVU monitors. Patient A/Ox4 denies pain. VSS on room air. R axillary site and R groin site perlcosed, assessed with cath lab RN. Sites c/d/i. Shift assessment completed at this time. 96 [id number]: Patient has been resting with eyes closed. R groin site is c/d/i. R axillary with slight oozing noted on gauze, manual pressure applied for 3 minutes, no hematoma noted. New gauze and tegaderm applied. Will monitor. Patient HOB increased, medication given per orders. 1300: Reassessment complete. R axillary site with old drainage on gauze site. No hematoma noted. R groin site c/d/i. HOB elevated for comfort. 1400: Patient placed on bedpan after requesting to void. Patient missed bedpan. Large void on bed. Complete bed changed. Patient tolerated turns well.     1500: Ordering lunch at this time. 1700: Reassessment completed. VSS on room air. R groin site c/d/i. R axillary site with old drainage on gauze, with no new drainage or hematoma noted.  Patient denies pain    1900: Report given to [facility]

## 2020-10-21 NOTE — PROGRESS NOTES
Occupational Therapy    Pt transferred to ICU after cath lab. Please re-order therapy when pt medically stable to participate.     Electronically signed by Keshia Peace OT on 10/21/2020 at 3:41 PM

## 2020-10-22 VITALS
SYSTOLIC BLOOD PRESSURE: 101 MMHG | RESPIRATION RATE: 18 BRPM | WEIGHT: 111.77 LBS | HEIGHT: 62 IN | DIASTOLIC BLOOD PRESSURE: 49 MMHG | BODY MASS INDEX: 20.57 KG/M2 | TEMPERATURE: 98.5 F | HEART RATE: 67 BPM | OXYGEN SATURATION: 99 %

## 2020-10-22 LAB
ANION GAP SERPL CALCULATED.3IONS-SCNC: 10 MMOL/L (ref 3–16)
BUN BLDV-MCNC: 25 MG/DL (ref 7–20)
CALCIUM SERPL-MCNC: 8.5 MG/DL (ref 8.3–10.6)
CHLORIDE BLD-SCNC: 101 MMOL/L (ref 99–110)
CO2: 25 MMOL/L (ref 21–32)
CREAT SERPL-MCNC: 0.8 MG/DL (ref 0.6–1.2)
GFR AFRICAN AMERICAN: >60
GFR NON-AFRICAN AMERICAN: >60
GLUCOSE BLD-MCNC: 103 MG/DL (ref 70–99)
HCT VFR BLD CALC: 29.9 % (ref 36–48)
HEMOGLOBIN: 10.4 G/DL (ref 12–16)
MCH RBC QN AUTO: 31.3 PG (ref 26–34)
MCHC RBC AUTO-ENTMCNC: 34.9 G/DL (ref 31–36)
MCV RBC AUTO: 89.7 FL (ref 80–100)
PDW BLD-RTO: 12.6 % (ref 12.4–15.4)
PLATELET # BLD: 165 K/UL (ref 135–450)
PMV BLD AUTO: 8.5 FL (ref 5–10.5)
POTASSIUM SERPL-SCNC: 4 MMOL/L (ref 3.5–5.1)
RBC # BLD: 3.33 M/UL (ref 4–5.2)
SODIUM BLD-SCNC: 136 MMOL/L (ref 136–145)
WBC # BLD: 8.5 K/UL (ref 4–11)

## 2020-10-22 PROCEDURE — 94760 N-INVAS EAR/PLS OXIMETRY 1: CPT

## 2020-10-22 PROCEDURE — 80048 BASIC METABOLIC PNL TOTAL CA: CPT

## 2020-10-22 PROCEDURE — 6370000000 HC RX 637 (ALT 250 FOR IP): Performed by: NURSE PRACTITIONER

## 2020-10-22 PROCEDURE — 6370000000 HC RX 637 (ALT 250 FOR IP): Performed by: INTERNAL MEDICINE

## 2020-10-22 PROCEDURE — 85027 COMPLETE CBC AUTOMATED: CPT

## 2020-10-22 PROCEDURE — 2580000003 HC RX 258: Performed by: INTERNAL MEDICINE

## 2020-10-22 PROCEDURE — 99238 HOSP IP/OBS DSCHRG MGMT 30/<: CPT | Performed by: INTERNAL MEDICINE

## 2020-10-22 PROCEDURE — 36415 COLL VENOUS BLD VENIPUNCTURE: CPT

## 2020-10-22 RX ORDER — CLOPIDOGREL BISULFATE 75 MG/1
75 TABLET ORAL DAILY
Qty: 30 TABLET | Refills: 3 | Status: SHIPPED | OUTPATIENT
Start: 2020-10-23

## 2020-10-22 RX ORDER — METOPROLOL SUCCINATE 25 MG/1
25 TABLET, EXTENDED RELEASE ORAL DAILY
Status: DISCONTINUED | OUTPATIENT
Start: 2020-10-22 | End: 2020-10-22 | Stop reason: HOSPADM

## 2020-10-22 RX ORDER — METOPROLOL SUCCINATE 50 MG/1
50 TABLET, EXTENDED RELEASE ORAL DAILY
Qty: 90 TABLET | Refills: 3 | Status: SHIPPED | OUTPATIENT
Start: 2020-10-23 | End: 2020-12-11

## 2020-10-22 RX ORDER — ATORVASTATIN CALCIUM 80 MG/1
80 TABLET, FILM COATED ORAL NIGHTLY
Qty: 30 TABLET | Refills: 3 | Status: SHIPPED | OUTPATIENT
Start: 2020-10-22 | End: 2020-11-04

## 2020-10-22 RX ADMIN — Medication 10 ML: at 08:15

## 2020-10-22 RX ADMIN — CLOPIDOGREL BISULFATE 75 MG: 75 TABLET ORAL at 08:15

## 2020-10-22 RX ADMIN — TIMOLOL MALEATE 1 DROP: 5 SOLUTION/ DROPS OPHTHALMIC at 08:08

## 2020-10-22 RX ADMIN — METOPROLOL SUCCINATE 25 MG: 25 TABLET, EXTENDED RELEASE ORAL at 11:02

## 2020-10-22 RX ADMIN — AMLODIPINE BESYLATE 5 MG: 5 TABLET ORAL at 08:15

## 2020-10-22 RX ADMIN — ASPIRIN 81 MG: 81 TABLET, CHEWABLE ORAL at 08:15

## 2020-10-22 ASSESSMENT — PAIN SCALES - GENERAL
PAINLEVEL_OUTOF10: 0

## 2020-10-22 NOTE — DISCHARGE SUMMARY
[facility]  Cardiology Consult    Nadira Johnson  1935 October 22, 2020      CC: Left arm pain    Interval History:  S/p LM/LAD PCI with impella support   Doing well today, no complaints, all access sites without issue       Subjective:     History of Present Illness:    Nadira Johnson is a 81 y.o. patient with a PMH significant for chronic disease, coronary bypass surgery hyperlipidemia presented with complaints of chest pain. 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 Significant left main disease. 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 Past Medical History:   has a past medical history of CAD (coronary artery disease), GERD (gastroesophageal reflux disease), Hyperlipidemia, Hypertension, PAF (paroxysmal atrial fibrillation) ([facility] [address] S/P AAA repair, and Thoracic aortic aneurysm ([facility] [address] Surgical History:   has a past surgical history that includes Hysterectomy; Thoracic aortic aneurysm repair; Coronary artery bypass graft (12/2000); and Cholecystectomy. Social History:   reports that she quit smoking about 24 years ago. Her smoking use included cigarettes. She has a 10.00 pack-year smoking history. She has never used smokeless tobacco. She reports current alcohol use. Family History:  family history includes Heart Disease in her father and mother; High Blood Pressure in her mother; Stroke in her mother. Home Medications:  Were reviewed and are listed in nursing record and/or below  Prior to Admission medications    Medication Sig Start Date End Date Taking?  Authorizing Provider   atorvastatin (LIPITOR) 80 MG tablet Take 1 tablet by mouth nightly 10/22/20  Yes Vandana Morales MD   metoprolol succinate (TOPROL XL) 50 MG extended release tablet Take 1 tablet by mouth daily 10/23/20  Yes Vandana Morales MD   clopidogrel (PLAVIX) 75 MG tablet Take 1 tablet by mouth daily 10/23/20  Yes Vandana Morales MD   LORazepam (ATIVAN) 1 MG tablet Take 1 mg by mouth every 6 hours as needed for Anxiety. Yes Historical Provider, MD   omeprazole (PRILOSEC) 20 MG capsule Take 40 mg by mouth daily. Yes Historical Provider, MD   clidinium-chlordiazePOXIDE (LIBRAX) 2.5-5 MG per capsule Take 1 capsule by mouth 4 times daily (before meals and nightly). Yes Historical Provider, MD   Calcium Carbonate (CALTRATE 600 PO) Take  by mouth. Yes Historical Provider, MD   Multiple Vitamins-Minerals (THERAPEUTIC MULTIVITAMIN-MINERALS) tablet Take 1 tablet by mouth daily. Yes Historical Provider, MD   aspirin 81 MG tablet Take 81 mg by mouth daily. Yes Historical Provider, MD   levobunolol (BETAGAN) 0.5 % ophthalmic solution Place 1 drop into the left eye 2 times daily    Yes Historical Provider, MD   fluticasone (FLONASE) 50 MCG/ACT nasal spray 2 sprays by Nasal route daily 2 sprays each side once/day 1/14/16   Michail Dance, MD        CURRENT Medications:  metoprolol succinate (TOPROL XL) extended release tablet 25 mg, Daily  sodium chloride flush 0.9 % injection 10 mL, 2 times per day  sodium chloride flush 0.9 % injection 10 mL, PRN  acetaminophen (TYLENOL) tablet 650 mg, Q4H PRN  atorvastatin (LIPITOR) tablet 80 mg, Nightly  potassium chloride (KLOR-CON M) extended release tablet 40 mEq, PRN    Or  potassium bicarb-citric acid (EFFER-K) effervescent tablet 40 mEq, PRN    Or  potassium chloride 10 mEq/100 mL IVPB (Peripheral Line), PRN  potassium chloride (KLOR-CON M) extended release tablet 20 mEq, Once  clopidogrel (PLAVIX) tablet 75 mg, Daily  timolol (TIMOPTIC) 0.5 % ophthalmic solution 1 drop, BID  amLODIPine (NORVASC) tablet 5 mg, Daily  aspirin chewable tablet 81 mg, Daily  LORazepam (ATIVAN) tablet 1 mg, Q6H PRN  perflutren lipid microspheres (DEFINITY) injection 1.65 mg, ONCE PRN  ondansetron (ZOFRAN) injection 4 mg, Q6H PRN        Allergies:  Amoxil [amoxicillin]           Review of Systems: SEE HPI   · Constitutional: no unanticipated weight loss.  There's been no change in energy level, sleep pattern, or activity level. No fevers, chills. · Eyes: No visual changes or diplopia. No scleral icterus. · ENT: No Headaches, hearing loss or vertigo. No mouth sores or sore throat. · Cardiovascular: No Chest pain, tightness or discomfort.  No Shortness of breath. No Dyspnea on exertion, Orthopnea, Paroxysmal nocturnal dyspnea or breathlessness at rest.   No Palpitations.  No Syncope ('blackouts', 'faints', 'collapse') or dizziness. · Respiratory: No cough or wheezing, no sputum production. No hematemesis. · Gastrointestinal: No abdominal pain, appetite loss, blood in stools. No change in bowel or bladder habits. · Genitourinary: No dysuria, trouble voiding, or hematuria. · Musculoskeletal:  No gait disturbance, no joint complaints. · Integumentary: No rash or pruritis. · Neurological: No headache, diplopia, change in muscle strength, numbness or tingling. · Psychiatric: No anxiety or depression. · Endocrine: No temperature intolerance. No excessive thirst, fluid intake, or urination. No tremor. · Hematologic/Lymphatic: No abnormal bruising or bleeding, blood clots or swollen lymph nodes. · Allergic/Immunologic: No nasal congestion or hives. Objective:     PHYSICAL EXAM:      Vitals:    10/22/20 1125   BP:    Pulse: 67   Resp:    Temp:    SpO2:     Weight: 111 lb 12.4 oz (50.7 kg)       General Appearance:  Alert, cooperative, no distress, appears stated age. Head:  Normocephalic, without obvious abnormality, atraumatic. Eyes:  Pupils equal and round. No scleral icterus. Mouth: Moist mucosa, no pharyngeal erythema. Nose: Nares normal. No drainage or sinus tenderness. Neck: Supple, symmetrical, trachea midline. No adenopathy. No tenderness/mass/nodules. No carotid bruit or elevated JVD. Lungs:   Respiratory Effort: Normal   Auscultation: Clear to auscultation bilaterally, respirations unlabored. No wheeze, rales   Chest Wall:  No tenderness or deformity. Cardiovascular:    Pulses  Palpation: normal   Ascultation: Regular rate, S1/ S2 normal. No murmur, rub, or gallop. 2+ radial and pedal pulses, symmetric  Carotid  Femoral   Abdomen and Gastrointestinal:   Soft, non-tender, bowel sounds active. Liver and Spleen  Masses   Musculoskeletal: No muscle wasting  Back  Gait   Extremities: Extremities normal, atraumatic. No cyanosis or edema. No cyanosis clubbing       Skin: Inspection and palpation performed, no rashes or lesions. Pysch: Normal mood and affect.  Alert and oriented to time place person   Neurologic: Normal gross motor and sensory exam.       Labs     All labs have been reviewed    Lab Results   Component Value Date    WBC 8.5 10/22/2020    RBC 3.33 10/22/2020    HGB 10.4 10/22/2020    HCT 29.9 10/22/2020    MCV 89.7 10/22/2020    RDW 12.6 10/22/2020     10/22/2020     Lab Results   Component Value Date     10/22/2020    K 4.0 10/22/2020     10/22/2020    CO2 25 10/22/2020    BUN 25 10/22/2020    CREATININE 0.8 10/22/2020    GFRAA >60 10/22/2020    LABGLOM >60 10/22/2020    GLUCOSE 103 10/22/2020    CALCIUM 8.5 10/22/2020     No results found for: PTINR  No results found for: LABA1C  No results found for: CKTOTAL, CKMB, CKMBINDEX, TROPONINI    Cardiac, Vascular and Imaging Data all Personally Reviewed in Detail by Myself      EKG: Atrial fibrillation with rapid ventricular response with premature ventricular or aberrantly conducted complexesIncomplete right bundle branch blockLeft anterior fascicular blockAnteroseptal infarct , age undeterminedAbnormal ECGNo previous ECGs availableConfirmed by Erin Conklin MD, KIRAN ([id number]) on 10/19/2020 10:01:19 AM    Echocardiogram: LVEF 50%    ANGIOGRAM/CORONARY ARTERIOGRAM:        The left main coronary artery has a 90% stenosis .     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        INTERVENTION  1. Using a JR diagnostic catheter from the femoral approach a right axillary angiogram was performed   2. Using fluro/US/micropuncture technique the 2nd portion of the axillary artery was accessed, 2 precloses were deployed   3. Impella CP percutaneous LVAD was positioned into the LV apex without complication   4. Xb3.5 guide  5. 300 runthrough wire used to cross LM/LAD lesion   6. Exchanged for a rotafloppy wire  7. Rotational atherectomy as performed of the left main and proximal LAD using a 1.5mm ashish X 2 passes   8. Predilation was performed using a 2.5/3.0 regular balloon  9. IVUS catheter was passed to the LAD ( distal reference diameter 4mm)   10. Tod 3.0 X 30 mm CARROL deployed to the prox LAD   11. Calhan 3.5 X 26 mm CARROL deployed to the prox LAD and left main   12. Post-dilation was performed with 3.0/4.0 NC balloons to 15 SHANTEL   13. Anomlaous LCx was wired using a fielder XT however no balloon would cross   14. Impella CP was successfully weaned and explanted in the CCL   15. A 6.0 X 20 mm balloon was positioned into the axillary artery for dry closure   16. 2 perclose deployed with successful hemostasis w/ post deployment angiogram without complication      SUMMARY:   -PercAx Impella CP placement and successful explant  - S/p successful atherectomy and IVUS guided PCI prox LAD and left main X 2 CARROL         RECOMMENDATION:       1. Recommend beta blockade, high potency statin, aspirin and plavix for 12 months  2. Referral to cardiac rehab placed  3. Follow up in 1-2 weeks with cardiology  4.  Likely d/c home in AM         Assessment and Plan     -Non-ST elevation microinfarction  S/p PCI   ASA/plavix for 12 months   Statin  Beta blocakde       D/c home today   D/c condition: stable     Megan Blum MD [id number], Interventional Cardiology, and Peripheral Vascular [address] [facility]   (C): [phone]  (O): [phone]

## 2020-10-22 NOTE — PROGRESS NOTES
Right Axilla and right groinPost procedure site check completed. Procedure site is within normal limits (however echymotic) and appears to be free of complications. All concerns were reviewed and questions answered. Patient verbalized understanding.

## 2020-10-22 NOTE — PROGRESS NOTES
Comprehensive Nutrition Assessment    Type and Reason for Visit:  Initial, Patient Education(los)    Nutrition Recommendations/Plan:   Continue current diet; cardiac   Start ONS; enlive BID     Nutrition Assessment:  LOS. Hx of GERD, HLD, HTN, IBS. S/p CABG, TAA repair. Pt states no nutritional problems PTA, but has decreased in the hospital d/t food preferences. Reports usual body weight to be around 120 lbs. This indicates a 5.7% weight loss per EMR weight of 117 lbs on 7/8/2020. Will order ONS. Educated patient on Heart-Healthy Nutrition Therapy. Verbal education and handouts provided. Pt said she was familiar reading nutrition labels. Pt stated that she eats some processed foods and occasionally uses salt shaker. Pt seems likely to adhere to diet. Malnutrition Assessment:  Malnutrition Status:  Mild malnutrition    Context:  Chronic Illness     Findings of the 6 clinical characteristics of malnutrition:  Energy Intake:  Mild decrease in energy intake (Comment)  Weight Loss:  1 - Mild weight loss (specify amount and time period)     Body Fat Loss:  No significant body fat loss     Muscle Mass Loss:  No significant muscle mass loss    Fluid Accumulation:  No significant fluid accumulation     Strength:  Not Performed    Estimated Daily Nutrient Needs:  Energy (kcal):  2206-6592 (25-30); Weight Used for Energy Requirements:  Current     Protein (g):  51-66 (1-1.3 g/kg CBW); Weight Used for Protein Requirements:  Current        Fluid (ml/day):  1 ml per kcal; Weight Used for Fluid Requirements:  Current      Nutrition Related Findings:  Last BM 10/18. Wounds:  None       Current Nutrition Therapies:    DIET CARDIAC;     Anthropometric Measures:  · Height: 5' 2\" (157.5 cm)  · Current Body Weight: 111 lb (50.3 kg)   · Admission Body Weight: 110 lb (49.9 kg)    · Usual Body Weight: 117 lb (53.1 kg)(per EMR 7/8/2020)     · Ideal Body Weight: 110 lbs; % Ideal Body Weight 100.9 %   · BMI: 20.3  · Adjusted Body Weight:  ; No Adjustment   · BMI Categories: Normal Weight (BMI 18.5-24. 9)       Nutrition Diagnosis:   · Mild malnutrition related to other (comment)(poor appetite) as evidenced by poor intake prior to admission, weight loss      Nutrition Interventions:   Food and/or Nutrient Delivery:  Continue Current Diet, Start Oral Nutrition Supplement  Nutrition Education/Counseling:  No recommendation at this time   Coordination of Nutrition Care:  Continued Inpatient Monitoring    Goals:  consume >50% of meals this admission. Nutrition Monitoring and Evaluation:   Behavioral-Environmental Outcomes:  (n/a)   Food/Nutrient Intake Outcomes:  Food and Nutrient Intake, Supplement Intake  Physical Signs/Symptoms Outcomes:  Biochemical Data, Weight, Skin, Nutrition Focused Physical Findings, Fluid Status or Edema     Discharge Planning:     Too soon to determine     Electronically signed by Azael Rendon on 10/22/20 at 10:20 AM EDT    Contact: [phone]

## 2020-10-26 ENCOUNTER — TELEPHONE (OUTPATIENT)
Dept: CARDIOLOGY CLINIC | Age: 85
End: 2020-10-26

## 2020-10-26 NOTE — TELEPHONE ENCOUNTER
Discussed with Dr. Iqra Herndon. She should continue to monitor her symptoms. She can use Tylenol for pain and alternate between hot/cold treatments at the area as well.

## 2020-10-26 NOTE — TELEPHONE ENCOUNTER
Patient denies discharge from the procedure site, fever. She states the site is discolored blue and black and swollen to her elbow. She is wondering when she would start to feel better?

## 2020-10-26 NOTE — TELEPHONE ENCOUNTER
Pt had LHC on 10/16 and she states her arm is still swollen and in pain. She wants to know how long it will be until the swelling and pain will last. She states the bruising is very dark and all the way up to her armpit.     Please call her back at [phone]

## 2020-10-28 ENCOUNTER — TELEPHONE (OUTPATIENT)
Dept: CARDIOLOGY CLINIC | Age: 85
End: 2020-10-28

## 2020-10-30 ENCOUNTER — TELEPHONE (OUTPATIENT)
Dept: CARDIOLOGY CLINIC | Age: 85
End: 2020-10-30

## 2020-10-30 NOTE — TELEPHONE ENCOUNTER
Anastasia Orr called in this afternoon and is concerned about her BP. She has been keeping a log of BP and HR enoch 10/27 per Dr. Gentry Peñaloza and today is really not feeling well. This morning her BP was 147/83 and HR was 74, She has been feeling very fatigued all day and took it again this afternoon and 166/96 and HR was 66.     She can be reached at [phone]

## 2020-10-30 NOTE — TELEPHONE ENCOUNTER
Called Donal and she said that she did see her PCP this week and was told to restart her Lisinopril 5 mg daily if her blood pressure continues to be elevated but she wanted to check with Dr Tamika Jay before doing this. I let her know that it would be fine for her to take this. Continue to monitor blood pressure and call back if this does not lower her blood pressure. This will also be discussed at her upcoming visit.

## 2020-11-02 NOTE — TELEPHONE ENCOUNTER
Pt called in stating that the lisinopril does help when she takes it in the morning. But overnight and into the morning it is high. I did let her know this will be discussed at her appt on 11/4.     You can reach her at [phone]

## 2020-11-02 NOTE — TELEPHONE ENCOUNTER
Continue medication, cut back on sodium and f/u this week. Dr. Samm Polk will make adjustments in medication as needed.

## 2020-11-03 NOTE — PROGRESS NOTES
Cardiology Consultation       Chief Complaint:   Chief Complaint   Patient presents with    Follow-Up from Hospital     s/p LHC/PCI; no cc but states she doesn't feel well         Subjective:   History of Present Illness:  Angela Gordon is a 81 y.o. female who presents with the above complaint. PMH of previous CABG, TAA repair, HTN, HLD who was admitted with severe jaw pain 2-3 nights ago to [facility]. She ruled in for non-STEMI with a troponin of 3. She was transferred here for further evaluation and underwent diagnostic angiography to the left radial artery. Developed a large hematoma. Echo showed normal LV function. Developed shortness of breath with proBNP of 1300 treated with Lasix. Patient returns today in follow up. She is accompanied by her  today. Says she doesn't feel well. BP has been elevated at home although she reports compliance with medication. Says she has always had tremors, but now worse than before. Denies any chest pain, pressure or tightness, shortness of breath at rest, COLIN, palpitations, orthopnea, PND or syncope.        Prior cardiac testing:    EK20, sinus bradycardia    Stress Test:     Echo: 10/16/20  LV cavity is normal  Normal global wall motion. Visual EF is 55-60%. Indeterminate diastolic filling pattern. Sigmoid septum. Cath: 10/16/20  ANGIOGRAM/CORONARY ARTERIOGRAM:        The left main coronary artery is severely calcified, with prox and distal 90% lesion .     The left anterior descending artery is moderate diffuse disease.     The left circumflex artery is 100% occluded      The right coronary artery is 100% occluded      SVG to PDA patent   SVG to OM1 patent   LIMA is atretic      Abdominal aortogram:     Descending aortic aneurysm  Tortuous common iliac arteries bilaterally       INTERVENTION  1.  None     SUMMARY:   Severe LM stenosis   SVG to PDA/OM1 patent     Echo: 10/17/20  The aortic root & ascending aorta are mildly dilated (4 cm). Normal LV size,wall thickness and motion. Estimated ejection fraction is 70%. Trivial mitral regurgitation is present. The left atrium is normal in size. Mild aortic regurgitation. Normal right ventricular size and function. Trivial tricuspid regurgitation. Cath: 10/21/20  ANGIOGRAM/CORONARY ARTERIOGRAM:        The left main coronary artery has a 90% stenosis .     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        INTERVENTION  2. Using a JR diagnostic catheter from the femoral approach a right axillary angiogram was performed   3. Using fluro/US/micropuncture technique the 2nd portion of the axillary artery was accessed, 2 precloses were deployed   4. Impella CP percutaneous LVAD was positioned into the LV apex without complication   5. Xb3.5 guide  6. 300 runthrough wire used to cross LM/LAD lesion   7. Exchanged for a rotafloppy wire  8. Rotational atherectomy as performed of the left main and proximal LAD using a 1.5mm ashish X 2 passes   9. Predilation was performed using a 2.5/3.0 regular balloon  10. IVUS catheter was passed to the LAD ( distal reference diameter 4mm)   11. [place] 3.0 X 30 mm CARROL deployed to the prox LAD   12. [place] 3.5 X 26 mm CARROL deployed to the prox LAD and left main   13. Post-dilation was performed with 3.0/4.0 NC balloons to 15 SHANTEL   14. Anomlaous LCx was wired using a fielder XT however no balloon would cross   15. Impella CP was successfully weaned and explanted in the CCL   16.  A 6.0 X 20 mm balloon was positioned into the axillary artery for dry closure   17. 2 perclose deployed with successful hemostasis w/ post deployment angiogram without complication      SUMMARY:   -PercAx Impella CP placement and successful explant  - S/p successful atherectomy and IVUS guided PCI prox LAD and left main X 2 CARROL Past Medical History:   has a past medical history of CAD (coronary artery disease), GERD (gastroesophageal reflux disease), Hyperlipidemia, Hypertension, PAF (paroxysmal atrial fibrillation) ([facility] [address] S/P AAA repair, and Thoracic aortic aneurysm ([facility] [address] Surgical History:   has a past surgical history that includes Hysterectomy; Thoracic aortic aneurysm repair; Coronary artery bypass graft (12/2000); and Cholecystectomy. Social History:   reports that she quit smoking about 24 years ago. Her smoking use included cigarettes. She has a 10.00 pack-year smoking history. She has never used smokeless tobacco. She reports current alcohol use. Family History:  family history includes Heart Disease in her father and mother; High Blood Pressure in her mother; Stroke in her mother. Home Medications:  Were reviewed and are listed in nursing record and/or below  Prior to Admission medications    Medication Sig Start Date End Date Taking? Authorizing Provider   lisinopril (PRINIVIL;ZESTRIL) 20 MG tablet Take 20 mg by mouth daily   Yes Historical Provider, MD   simvastatin (ZOCOR) 40 MG tablet Take 40 mg by mouth nightly   Yes Historical Provider, MD   metoprolol succinate (TOPROL XL) 50 MG extended release tablet Take 1 tablet by mouth daily 10/23/20  Yes Antia Cabrera MD   clopidogrel (PLAVIX) 75 MG tablet Take 1 tablet by mouth daily 10/23/20  Yes Anita Cabrera MD   LORazepam (ATIVAN) 1 MG tablet Take 1 mg by mouth every 6 hours as needed for Anxiety. Yes Historical Provider, MD   omeprazole (PRILOSEC) 20 MG capsule Take 40 mg by mouth daily. Yes Historical Provider, MD   clidinium-chlordiazePOXIDE (LIBRAX) 2.5-5 MG per capsule Take 1 capsule by mouth 4 times daily (before meals and nightly). Yes Historical Provider, MD   Calcium Carbonate (CALTRATE 600 PO) Take  by mouth.    Yes Historical Provider, MD   Multiple Vitamins-Minerals (THERAPEUTIC MULTIVITAMIN-MINERALS) tablet Take 1 tablet by mouth daily. Yes Historical Provider, MD   aspirin 81 MG tablet Take 81 mg by mouth daily. Yes Historical Provider, MD   levobunolol (BETAGAN) 0.5 % ophthalmic solution Place 1 drop into the left eye 2 times daily    Yes Historical Provider, MD          Allergies:  Amoxil [amoxicillin]     Review of Systems:   · Constitutional: no unanticipated weight loss. There's been no change in energy level, sleep pattern, or activity level. No fevers, chills. · Eyes: No visual changes or diplopia. No scleral icterus. · ENT: No Headaches, hearing loss or vertigo. No mouth sores or sore throat. · Cardiovascular: as reviewed in HPI  · Respiratory: No cough or wheezing, no sputum production. No hemoptysis. · Gastrointestinal: No abdominal pain, appetite loss, blood in stools. No change in bowel or bladder habits. · Genitourinary: No dysuria, trouble voiding, or hematuria. · Musculoskeletal:  No gait disturbance, no joint complaints. · Integumentary: No rash or pruritis. · Neurological: No headache, diplopia, change in muscle strength, numbness or tingling. · Psychiatric: No anxiety or depression. · Endocrine: No temperature intolerance. No excessive thirst, fluid intake, or urination. No tremor. · Hematologic/Lymphatic: No abnormal bruising or bleeding, blood clots or swollen lymph nodes. · Allergic/Immunologic: No nasal congestion or hives. Objective:   PHYSICAL EXAM:    Vitals:    11/04/20 1402   BP: (!) 156/70   Pulse:    Temp:    SpO2:     Weight: 112 lb 3.2 oz (50.9 kg)       General Appearance:  Alert, cooperative, no distress, appears stated age. Head:  Normocephalic, without obvious abnormality, atraumatic. Eyes:  Pupils equal and round. No scleral icterus. Mouth: Moist mucosa, no pharyngeal erythema. Nose: Nares normal. No drainage or sinus tenderness. Neck: Supple, symmetrical, trachea midline. No adenopathy. No tenderness/mass/nodules. No carotid bruit or elevated JVD.    Lungs:   Clear to auscultation bilaterally, respirations unlabored. No wheeze, rales, or rhonchi. Chest Wall:  No tenderness or deformity. Heart:  Regular rate. S1/S2 normal. No murmur, rub, or gallop. Abdomen:   Soft, non-tender, bowel sounds active. Musculoskeletal: No muscle wasting or digital clubbing. Extremities: Extremities normal, atraumatic. No cyanosis or edema. Pulses: 2+ radial and carotid pulses, symmetric. Skin: No rashes or lesions. Pysch: Normal mood and affect. Alert and oriented x 4. Neurologic: Normal gross motor and sensory exam.       Labs     CBC:   Lab Results   Component Value Date    WBC 8.5 10/22/2020    RBC 3.33 10/22/2020    HGB 10.4 10/22/2020    HCT 29.9 10/22/2020    MCV 89.7 10/22/2020    RDW 12.6 10/22/2020     10/22/2020     CMP:  Lab Results   Component Value Date     10/22/2020    K 4.0 10/22/2020     10/22/2020    CO2 25 10/22/2020    BUN 25 10/22/2020    CREATININE 0.8 10/22/2020    GFRAA >60 10/22/2020    LABGLOM >60 10/22/2020    GLUCOSE 103 10/22/2020    CALCIUM 8.5 10/22/2020     PT/INR:  No results found for: PTINR  HgBA1c:No results found for: LABA1C  Troponin: No results found for: TROPONINI      CURRENT Medications:  Current Outpatient Medications on File Prior to Visit   Medication Sig Dispense Refill    lisinopril (PRINIVIL;ZESTRIL) 20 MG tablet Take 20 mg by mouth daily      simvastatin (ZOCOR) 40 MG tablet Take 40 mg by mouth nightly      metoprolol succinate (TOPROL XL) 50 MG extended release tablet Take 1 tablet by mouth daily 90 tablet 3    clopidogrel (PLAVIX) 75 MG tablet Take 1 tablet by mouth daily 30 tablet 3    LORazepam (ATIVAN) 1 MG tablet Take 1 mg by mouth every 6 hours as needed for Anxiety.  omeprazole (PRILOSEC) 20 MG capsule Take 40 mg by mouth daily.  clidinium-chlordiazePOXIDE (LIBRAX) 2.5-5 MG per capsule Take 1 capsule by mouth 4 times daily (before meals and nightly).       Calcium Carbonate (CALTRATE 600 PO) Take  by mouth.  Multiple Vitamins-Minerals (THERAPEUTIC MULTIVITAMIN-MINERALS) tablet Take 1 tablet by mouth daily.  aspirin 81 MG tablet Take 81 mg by mouth daily.  levobunolol (BETAGAN) 0.5 % ophthalmic solution Place 1 drop into the left eye 2 times daily        No current facility-administered medications on file prior to visit. Assessment and Plan   1) CAD  S/p CABG x 3 in [facility] at [facility]  S/p successful atherectomy and IVUS guided PCI prox LAD and left main X 2 CARROL   Denies any chest discomfort or SOB  Compliant with medical therapy  Continue Plavix, ASA, Statin, B-blocker and ACE    2) Hypertension  BP is elevated on today's exam  Was taking Lisinopril 5 mg QD, will increase to 20 mg QD  Repeat renal panel in 2 weeks     3) Hyperlipidemia  Goal LDL <70  Current LDL 65 6/23/20      Follow up at the [facility] office in 4-6 months      Thank you for allowing us to participate in the care of Gary Hodgson MD [facility] [address]   C: [phone]  [place] Payment): [phone]     This note was scribed in the presence of Dr. Megan Blum by Denton Cabot, RN      Physician Attestation:  The scribes documentation has been prepared under my direction and personally reviewed by me in its entirety. I confirm the note above accurately reflects all work, treatment, procedures, and medical decision making performed by me.     Electronically signed by Merlinda Bass, MD on 11/15/2020 at 9:38 PM

## 2020-11-04 ENCOUNTER — OFFICE VISIT (OUTPATIENT)
Dept: CARDIOLOGY CLINIC | Age: 85
End: 2020-11-04
Payer: MEDICARE

## 2020-11-04 VITALS
BODY MASS INDEX: 20.65 KG/M2 | DIASTOLIC BLOOD PRESSURE: 70 MMHG | SYSTOLIC BLOOD PRESSURE: 156 MMHG | WEIGHT: 112.2 LBS | HEIGHT: 62 IN | TEMPERATURE: 98.2 F | OXYGEN SATURATION: 97 % | HEART RATE: 56 BPM

## 2020-11-04 PROCEDURE — 93000 ELECTROCARDIOGRAM COMPLETE: CPT | Performed by: INTERNAL MEDICINE

## 2020-11-04 PROCEDURE — 99214 OFFICE O/P EST MOD 30 MIN: CPT | Performed by: INTERNAL MEDICINE

## 2020-11-04 RX ORDER — LISINOPRIL 20 MG/1
20 TABLET ORAL DAILY
Qty: 90 TABLET | Refills: 3 | Status: SHIPPED | OUTPATIENT
Start: 2020-11-04 | End: 2020-12-01 | Stop reason: SDUPTHER

## 2020-11-04 RX ORDER — ATORVASTATIN CALCIUM 80 MG/1
80 TABLET, FILM COATED ORAL NIGHTLY
Qty: 30 TABLET | Refills: 5 | COMMUNITY
Start: 2020-11-04 | End: 2020-12-11

## 2020-11-04 RX ORDER — SIMVASTATIN 40 MG
40 TABLET ORAL NIGHTLY
COMMUNITY
End: 2020-11-04 | Stop reason: ALTCHOICE

## 2020-11-04 RX ORDER — LISINOPRIL 20 MG/1
20 TABLET ORAL DAILY
COMMUNITY
End: 2020-11-04 | Stop reason: SDUPTHER

## 2020-11-04 NOTE — PATIENT INSTRUCTIONS
Patient Education        High Blood Pressure: Care Instructions  Overview     It's normal for blood pressure to go up and down throughout the day. But if it stays up, you have high blood pressure. Another name for high blood pressure is hypertension. Despite what a lot of people think, high blood pressure usually doesn't cause headaches or make you feel dizzy or lightheaded. It usually has no symptoms. But it does increase your risk of stroke, heart attack, and other problems. You and your doctor will talk about your risks of these problems based on your blood pressure. Your doctor will give you a goal for your blood pressure. Your goal will be based on your health and your age. Lifestyle changes, such as eating healthy and being active, are always important to help lower blood pressure. You might also take medicine to reach your blood pressure goal.  Follow-up care is a key part of your treatment and safety. Be sure to make and go to all appointments, and call your doctor if you are having problems. It's also a good idea to know your test results and keep a list of the medicines you take. How can you care for yourself at home? Medical treatment  · If you stop taking your medicine, your blood pressure will go back up. You may take one or more types of medicine to lower your blood pressure. Be safe with medicines. Take your medicine exactly as prescribed. Call your doctor if you think you are having a problem with your medicine. · Talk to your doctor before you start taking aspirin every day. Aspirin can help certain people lower their risk of a heart attack or stroke. But taking aspirin isn't right for everyone, because it can cause serious bleeding. · See your doctor regularly. You may need to see the doctor more often at first or until your blood pressure comes down.   · If you are taking blood pressure medicine, talk to your doctor before you take decongestants or anti-inflammatory medicine, such as irregular heartbeat.     · You have symptoms of a stroke. These may include:  ? Sudden numbness, tingling, weakness, or loss of movement in your face, arm, or leg, especially on only one side of your body. ? Sudden vision changes. ? Sudden trouble speaking. ? Sudden confusion or trouble understanding simple statements. ? Sudden problems with walking or balance. ? A sudden, severe headache that is different from past headaches.     · You have severe back or belly pain. Do not wait until your blood pressure comes down on its own. Get help right away. Call your doctor now or seek immediate care if:    · Your blood pressure is much higher than normal (such as 180/120 or higher), but you don't have symptoms.     · You think high blood pressure is causing symptoms, such as:  ? Severe headache.  ? Blurry vision. Watch closely for changes in your health, and be sure to contact your doctor if:    · Your blood pressure measures higher than your doctor recommends at least 2 times. That means the top number is higher or the bottom number is higher, or both.     · You think you may be having side effects from your blood pressure medicine. Where can you learn more? Go to [email or web address] org and sign in to your [employer] account. Enter R914 in the [employer] box to learn more about \"High Blood Pressure: Care Instructions. \"     If you do not have an account, please click on the \"Sign Up Now\" link. Current as of: December 16, 2019               Content Version: 12.6  © 6383-2608 [employer]. Care instructions adapted under license by [facility] [employer] [facility] ([facility]). If you have questions about a medical condition or this instruction, always ask your healthcare professional. [place] [address] any warranty or liability for your use of this information. 1. Increase Lisinopril to 20 mg daily. 2. Repeat labs in 2 weeks.    3.Monitor BP at home and call the office in 2-3 weeks with an update.

## 2020-11-06 ENCOUNTER — TELEPHONE (OUTPATIENT)
Dept: CARDIOLOGY CLINIC | Age: 85
End: 2020-11-06

## 2020-11-06 NOTE — TELEPHONE ENCOUNTER
Gopal acevedo called in this morning stating her grandmother is at [facility]. Mehrdad Purvis has some concerns about the cardiologist and would like to speak to someone.        Serjio Nielsen can be reached at [address] [facility] [facility] room number is [phone]

## 2020-11-09 NOTE — TELEPHONE ENCOUNTER
Braulio Fitzpatrick states the patient is now home. She was concerned about [facility] changing medications but states Dr Francisco Parr was notified and everything was straightened out.

## 2020-11-16 NOTE — PROGRESS NOTES
Cardiology Consultation       Chief Complaint:   Chief Complaint   Patient presents with    Follow-Up from Hospital     recent hospital admission d/t A-Fib         Subjective:   History of Present Illness:  Kevin Barnard is a 81 y.o. female who presents with the above complaint. PMH of previous CABG, TAA repair, HTN, HLD. She presented with chest pain and jaw pain. She ruled in for non-STEMI with a troponin of 3. She was transferred here for further evaluation and underwent diagnostic angiography to the left radial artery on 10/21/20 . Developed a large hematoma. Echo showed normal LV function. Developed shortness of breath with proBNP of 1300 treated with Lasix. She was admitted to [facility] 20 and found in new onset atrial fibrillation with RVR. She did spontaneously convert overnight. She was discharged on amiodarone 200 mg BID and Eliquis 2.5 mg BID. Today, she presents for follow up. She states when she presented to [facility] she was experiencing jaw pain and when she checked her BP on her home monitor, he HR was elevated. Patient denies exertional chest pain/pressure, dyspnea at rest, COLIN, PND, orthopnea, palpitations, lightheadedness, weight changes, changes in LE edema, and syncope. She reports medication compliance and is tolerating. Prior cardiac testing:    EK20, sinus bradycardia    Stress Test:     Echo: 10/16/20  LV cavity is normal  Normal global wall motion. Visual EF is 55-60%. Indeterminate diastolic filling pattern. Sigmoid septum.       Cath: 10/16/20  ANGIOGRAM/CORONARY ARTERIOGRAM:        The left main coronary artery is severely calcified, with prox and distal 90% lesion .     The left anterior descending artery is moderate diffuse disease.     The left circumflex artery is 100% occluded      The right coronary artery is 100% occluded      SVG to PDA patent   SVG to OM1 patent   LIMA is atretic      Abdominal aortogram:     Descending aortic aneurysm  Tortuous common iliac arteries bilaterally       INTERVENTION  1. None     SUMMARY:   Severe LM stenosis   SVG to PDA/OM1 patent     Echo: 10/17/20  The aortic root & ascending aorta are mildly dilated (4 cm). Normal LV size,wall thickness and motion. Estimated ejection fraction is 70%. Trivial mitral regurgitation is present. The left atrium is normal in size. Mild aortic regurgitation. Normal right ventricular size and function. Trivial tricuspid regurgitation. Cath: 10/21/20  ANGIOGRAM/CORONARY ARTERIOGRAM:        The left main coronary artery has a 90% stenosis .     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        INTERVENTION  2. Using a JR diagnostic catheter from the femoral approach a right axillary angiogram was performed   3. Using fluro/US/micropuncture technique the 2nd portion of the axillary artery was accessed, 2 precloses were deployed   4. Impella CP percutaneous LVAD was positioned into the LV apex without complication   5. Xb3.5 guide  6. 300 runthrough wire used to cross LM/LAD lesion   7. Exchanged for a rotafloppy wire  8. Rotational atherectomy as performed of the left main and proximal LAD using a 1.5mm ashish X 2 passes   9. Predilation was performed using a 2.5/3.0 regular balloon  10. IVUS catheter was passed to the LAD ( distal reference diameter 4mm)   11. Hodges 3.0 X 30 mm CARROL deployed to the prox LAD   12. Tod 3.5 X 26 mm CARROL deployed to the prox LAD and left main   13. Post-dilation was performed with 3.0/4.0 NC balloons to 15 SHANTEL   14. Anomlaous LCx was wired using a fielder XT however no balloon would cross   15. Impella CP was successfully weaned and explanted in the CCL   16.  A 6.0 X 20 mm balloon was positioned into the axillary artery for dry closure   17. 2 perclose deployed with successful hemostasis w/ post deployment angiogram without complication      SUMMARY:   -PercAx Impella CP placement and successful explant  - S/p successful atherectomy and IVUS guided PCI prox LAD and left main X 2 CARROL       Past Medical History:   has a past medical history of CAD (coronary artery disease), GERD (gastroesophageal reflux disease), Hyperlipidemia, Hypertension, PAF (paroxysmal atrial fibrillation) ([facility] [address] S/P AAA repair, and Thoracic aortic aneurysm ([facility] [address] Surgical History:   has a past surgical history that includes Hysterectomy; Thoracic aortic aneurysm repair; Coronary artery bypass graft (12/2000); and Cholecystectomy. Social History:   reports that she quit smoking about 24 years ago. Her smoking use included cigarettes. She has a 10.00 pack-year smoking history. She has never used smokeless tobacco. She reports current alcohol use. Family History:  family history includes Heart Disease in her father and mother; High Blood Pressure in her mother; Stroke in her mother. Allergies:  Amoxil [amoxicillin]     Review of Systems:   · Constitutional: no unanticipated weight loss. There's been no change in energy level, sleep pattern, or activity level. No fevers, chills. · Eyes: No visual changes or diplopia. No scleral icterus. · ENT: No Headaches, hearing loss or vertigo. No mouth sores or sore throat. · Cardiovascular: as reviewed in HPI  · Respiratory: No cough or wheezing, no sputum production. No hemoptysis. · Gastrointestinal: No abdominal pain, appetite loss, blood in stools. No change in bowel or bladder habits. · Genitourinary: No dysuria, trouble voiding, or hematuria. · Musculoskeletal:  No gait disturbance, no joint complaints. · Integumentary: No rash or pruritis. · Neurological: No headache, diplopia, change in muscle strength, numbness or tingling. · Psychiatric: No anxiety or depression. · Endocrine: No temperature intolerance. No excessive thirst, fluid intake, or urination.  No tremor. · Hematologic/Lymphatic: No abnormal bruising or bleeding, blood clots or swollen lymph nodes. · Allergic/Immunologic: No nasal congestion or hives. Objective:   PHYSICAL EXAM:    Vitals:    11/17/20 0952   BP: 138/86   Pulse: 57   Temp: 97.5 °F (36.4 °C)   SpO2: 98%    Weight: 114 lb (51.7 kg)       General Appearance:  Alert, cooperative, no distress, appears stated age. Head:  Normocephalic, without obvious abnormality, atraumatic. Eyes:  Pupils equal and round. No scleral icterus. Mouth: Moist mucosa, no pharyngeal erythema. Nose: Nares normal. No drainage or sinus tenderness. Neck: Supple, symmetrical, trachea midline. No adenopathy. No tenderness/mass/nodules. No carotid bruit or elevated JVD. Lungs:   Clear to auscultation bilaterally, respirations unlabored. No wheeze, rales, or rhonchi. Chest Wall:  No tenderness or deformity. Heart:  Regular rate. S1/S2 normal. No murmur, rub, or gallop. Abdomen:   Soft, non-tender, bowel sounds active. Musculoskeletal: No muscle wasting or digital clubbing. Extremities: Extremities normal, atraumatic. No cyanosis or edema. Pulses: 2+ radial and carotid pulses, symmetric. Skin: No rashes or lesions. Pysch: Normal mood and affect. Alert and oriented x 4.    Neurologic: Normal gross motor and sensory exam.       Labs     CBC:   Lab Results   Component Value Date    WBC 8.5 10/22/2020    RBC 3.33 10/22/2020    HGB 10.4 10/22/2020    HCT 29.9 10/22/2020    MCV 89.7 10/22/2020    RDW 12.6 10/22/2020     10/22/2020     CMP:  Lab Results   Component Value Date     10/22/2020    K 4.0 10/22/2020     10/22/2020    CO2 25 10/22/2020    BUN 25 10/22/2020    CREATININE 0.8 10/22/2020    GFRAA >60 10/22/2020    LABGLOM >60 10/22/2020    GLUCOSE 103 10/22/2020    CALCIUM 8.5 10/22/2020     PT/INR:  No results found for: PTINR  HgBA1c:No results found for: LABA1C  Troponin: No results found for: [address] Medications:  Current Outpatient Medications on File Prior to Visit   Medication Sig Dispense Refill    apixaban (ELIQUIS) 2.5 MG TABS tablet Take 2.5 mg by mouth 2 times daily      amiodarone (CORDARONE) 200 MG tablet Take 200 mg by mouth daily      atorvastatin (LIPITOR) 80 MG tablet Take 1 tablet by mouth nightly 30 tablet 5    lisinopril (PRINIVIL;ZESTRIL) 20 MG tablet Take 1 tablet by mouth daily 90 tablet 3    metoprolol succinate (TOPROL XL) 50 MG extended release tablet Take 1 tablet by mouth daily 90 tablet 3    clopidogrel (PLAVIX) 75 MG tablet Take 1 tablet by mouth daily 30 tablet 3    LORazepam (ATIVAN) 1 MG tablet Take 1 mg by mouth every 6 hours as needed for Anxiety.  omeprazole (PRILOSEC) 20 MG capsule Take 40 mg by mouth daily.  clidinium-chlordiazePOXIDE (LIBRAX) 2.5-5 MG per capsule Take 1 capsule by mouth 4 times daily (before meals and nightly).  Calcium Carbonate (CALTRATE 600 PO) Take  by mouth.  Multiple Vitamins-Minerals (THERAPEUTIC MULTIVITAMIN-MINERALS) tablet Take 1 tablet by mouth daily.  aspirin 81 MG tablet Take 81 mg by mouth daily.  levobunolol (BETAGAN) 0.5 % ophthalmic solution Place 1 drop into the left eye 2 times daily        No current facility-administered medications on file prior to visit. Assessment and Plan   1) CAD  S/p CABG x 3 in 2000 at [facility]  S/p successful atherectomy and IVUS guided PCI prox LAD and left main X 2 CARROL   Denies any chest discomfort or SOB  Compliant with medical therapy  Continue Plavix, Statin, B-blocker and ACE  Discontinue ASA     2) Hypertension  Well controlled today in the office   Continue lisinopril 20 mg daily     3) Hyperlipidemia  Goal LDL <70  Current LDL 65 6/23/20    4) Atrial fibrillation, paroxsymal   New onset   Continue Eliquis 2.5 mg BID and amiodarone 200 mg daily    Follow up as scheduled.      Thank you for allowing us to participate in the care of Jovanny Tang MD [address] and Interventional Cardiology   [address]   (C): [phone]  (O): [phone]       This note was scribed in the presence of Azul Gordon MD by Nubia Ronquillo RN. Physician Attestation:  The scribes documentation has been prepared under my direction and personally reviewed by me in its entirety. I confirm the note above accurately reflects all work, treatment, procedures, and medical decision making performed by me.     Electronically signed by Saurabh Bermeo MD on 11/22/2020 at 12:47 PM

## 2020-11-17 ENCOUNTER — OFFICE VISIT (OUTPATIENT)
Dept: CARDIOLOGY CLINIC | Age: 85
End: 2020-11-17
Payer: MEDICARE

## 2020-11-17 ENCOUNTER — TELEPHONE (OUTPATIENT)
Dept: CARDIOLOGY CLINIC | Age: 85
End: 2020-11-17

## 2020-11-17 VITALS
BODY MASS INDEX: 20.98 KG/M2 | SYSTOLIC BLOOD PRESSURE: 138 MMHG | OXYGEN SATURATION: 98 % | TEMPERATURE: 97.5 F | DIASTOLIC BLOOD PRESSURE: 86 MMHG | WEIGHT: 114 LBS | HEART RATE: 57 BPM | HEIGHT: 62 IN

## 2020-11-17 PROCEDURE — 93000 ELECTROCARDIOGRAM COMPLETE: CPT | Performed by: INTERNAL MEDICINE

## 2020-11-17 PROCEDURE — 99214 OFFICE O/P EST MOD 30 MIN: CPT | Performed by: INTERNAL MEDICINE

## 2020-11-17 RX ORDER — AMIODARONE HYDROCHLORIDE 200 MG/1
200 TABLET ORAL DAILY
COMMUNITY
End: 2020-11-17 | Stop reason: SDUPTHER

## 2020-11-17 RX ORDER — AMIODARONE HYDROCHLORIDE 200 MG/1
200 TABLET ORAL DAILY
Qty: 90 TABLET | Refills: 3 | Status: SHIPPED
Start: 2020-11-17 | End: 2020-11-24 | Stop reason: DRUGHIGH

## 2020-11-18 NOTE — TELEPHONE ENCOUNTER
Pt called in stating that only taking amiodarone once daily is not helping. This morning she woke up around 730am and her HR was 129 and BP was 156/124. She wants to know if she can go back to taking 2 pills daily.     Please call her back at [phone]

## 2020-11-18 NOTE — TELEPHONE ENCOUNTER
Called and spoke to [facility] and she is concerned with the decrease of her Amiodarone. Explained to her that she could try taking the Amiodarone in the morning and her metoprolol in the evening to see if this helps control her heart rate better. Instructed her that if she feels her heart racing throughout the day she could take an extra 1/2 tablet of her metoprolol. She verbalized understanding. Also encouraged her to call if her heart rate continues to be elevated consistently.

## 2020-11-19 ENCOUNTER — TELEPHONE (OUTPATIENT)
Dept: CARDIOLOGY CLINIC | Age: 85
End: 2020-11-19

## 2020-11-19 NOTE — TELEPHONE ENCOUNTER
Called and spoke with patient, relayed message below. Patient expressed understanding. Med list updated.

## 2020-11-19 NOTE — TELEPHONE ENCOUNTER
Pt called since the medicine has been changed she is shaking and her bp is up,11/18 was 183/76 td 11/19 196/93. Would like a call please.     Bowen # [phone] (won't be home td til after 2pm)

## 2020-11-23 ENCOUNTER — TELEPHONE (OUTPATIENT)
Dept: CARDIOLOGY CLINIC | Age: 85
End: 2020-11-23

## 2020-11-23 NOTE — TELEPHONE ENCOUNTER
Called/spoke to pt. She states that she is experiencing some swelling in her left foot and going up left leg. This started a couple of weeks agao. No complaints of SOB or chest pain. She states she may have gained 2 - 3 lbs in last 2 weeks but is not for sure, she is also complaining of tremors.

## 2020-11-23 NOTE — TELEPHONE ENCOUNTER
Pt called and wanted to discuss how she is feeling with a nurse. Says since she started the AMIODARONE she is feeling very fatigued she has extreme tremors in her L hand and her L foot is swollen to where it looks like it is going to pop.     Bowen # [phone]

## 2020-11-23 NOTE — TELEPHONE ENCOUNTER
OV on 11/17/20. Hx of CAD, CABG, TAA repair, HTN, HLD and Afib. Recent angiogram on 10/12/20 (s/p successful atherectomy and IVUS guided PCI prox LAD and left main x 2 CARROL). She is reporting tremors since starting Amiodarone and is now on 200 mg daily. Are you you fine with her trying 100 mg daily with a repeat ECG in 1 week? She is also reporting L foot and leg swelling/pain. Order dopplers? Please advise thanks.

## 2020-11-24 RX ORDER — AMIODARONE HYDROCHLORIDE 100 MG/1
100 TABLET ORAL DAILY
COMMUNITY
End: 2020-12-11

## 2020-11-24 NOTE — TELEPHONE ENCOUNTER
Called and spoke with patient, relayed message below. Patient expressed understanding. Instructed to call scheduling to schedule doppler. Med list updated.

## 2020-11-24 NOTE — TELEPHONE ENCOUNTER
Discussed with Dr. Katie Middleton. Patient can reduce her dose of amiodarone to 100 mg daily and no need for repeat ECG. She can also complete left lower extremity venous study to evaluate her leg pain and swelling.

## 2020-11-25 ENCOUNTER — NURSE ONLY (OUTPATIENT)
Dept: CARDIOLOGY CLINIC | Age: 85
End: 2020-11-25

## 2020-11-25 ENCOUNTER — TELEPHONE (OUTPATIENT)
Dept: CARDIOLOGY CLINIC | Age: 85
End: 2020-11-25

## 2020-11-25 VITALS — DIASTOLIC BLOOD PRESSURE: 90 MMHG | HEART RATE: 76 BPM | SYSTOLIC BLOOD PRESSURE: 194 MMHG

## 2020-11-25 NOTE — LETTER
[facility] [address]  [address]  Phone: [phone]  Fax: [phone]    [facility], Bambi Brennan        November 27, 2020     KOREY Piedra - CNP  [address]    Patient: Marquita Stephen  MR Number: [id number]  YOB: 1935  Date of Visit: 11/25/2020    Dear Dr. Reyna Hoffman: Thank you for your referral. Progress note attached in visit summary. If you have questions, please do not hesitate to call me. I look forward to following Bowen along with you.     Sincerely,        SCHEDULE, [address]

## 2020-11-25 NOTE — PROGRESS NOTES
Pt came in for a bp check, bp is still running high. Pt has made the comment a couple of times about \"I am so nervous I don't know what I am doing\"   I advised pt just relax, take her time and she said \" I will try\". Pt is also extemely shakey. As she is sitting in the chair she is just shaking    Verified meds and they are correct per pt    Per pts machine:    L arm  200/82  P 75    R arm  206/100  P 71    This was all put in a message and sent to [address] [place] [address],[address] for review.

## 2020-11-25 NOTE — TELEPHONE ENCOUNTER
Patient called in stating her BP was 189/85 and pulse was 80. Spoke with [facility], RN she agreed patient should come in for BP check. She should have both arms checked and compare readings with home cuff.      Please route this message back to Sandrine.

## 2020-11-27 ENCOUNTER — TELEPHONE (OUTPATIENT)
Dept: CARDIOLOGY CLINIC | Age: 85
End: 2020-11-27

## 2020-11-27 NOTE — TELEPHONE ENCOUNTER
Dr. Miguel Flaherty,    Received call from patients daughter reporting that she feels that she is having SE of shaking from her amiodarone. She states she also has trouble finding words when she gets upset. I advised that with neurological symptoms she is exhbiting seeks evaluation in the ED may be warranted. She states that she does not think she it is stroke related as she only has trouble finding words when frustrated. She denies any facial dropping or weakness. She states that she feels like the shaking settles down somewhat before she takes he amiodarone. Instructed to hold amiodarone until I received recommendation from the doctor. Advised amiodarone has a long half life and stays in the system even after medication is stopped. She voiced concerns about her going into atrial fibrillation with the medication being stopped. Advised that atrial fibrillation is not a deadly or lethal heart rhythm and the main concern if she goes into afib is stroke risk reduction which she is taking eliquis for and also heart rate control  which is what she is taking Metoprolol for. She asked that I call the patient with any new recommendations from the Doctor. I called patient several time to speak with her directly regarding her symptoms and the phone would  and then the person answering would hang up this occurred three times. Please advise.

## 2020-11-30 NOTE — TELEPHONE ENCOUNTER
Spoke with patient, she saw her PCP today and he prescribed her some medications to help with her nerves. She wasn't having these issues until she was put on Amio. She would like to speak with Dr. Katie Middleton. I set up a V.V. for tomorrow.

## 2020-12-01 RX ORDER — LISINOPRIL 20 MG/1
40 TABLET ORAL DAILY
Qty: 90 TABLET | Refills: 3 | Status: SHIPPED | OUTPATIENT
Start: 2020-12-01 | End: 2021-05-12 | Stop reason: SDUPTHER

## 2020-12-01 NOTE — TELEPHONE ENCOUNTER
VV cancelled per Dr. Yanira Hodges who spoke with patient on the telephone. He will refer to [facility] [place]). Patient also instructed to stop Norvasc and continue Lisinopril and Metoprolol.

## 2020-12-02 ENCOUNTER — TELEPHONE (OUTPATIENT)
Dept: CARDIOLOGY CLINIC | Age: 85
End: 2020-12-02

## 2020-12-02 NOTE — TELEPHONE ENCOUNTER
Is it cost prohibitive since its the end of the year, possibly in the doughnut hole? If so could we provide samples for a few weeks and see if it would be affordable next year. She could try Xarelto but may be cost prohibitive like the Eliquis or would need to be switched to Coumadin and enrolled in the anticoagulation clinic.

## 2020-12-03 ENCOUNTER — TELEPHONE (OUTPATIENT)
Dept: CARDIOLOGY CLINIC | Age: 85
End: 2020-12-03

## 2020-12-03 NOTE — TELEPHONE ENCOUNTER
Pt had [facility] in October and states he gets sharp pains in her groin area every now and then.  She would like to speak to someone about this and see if this is normal.    Please call her back at [phone]

## 2020-12-03 NOTE — TELEPHONE ENCOUNTER
Spoke with patient and she states she applied for patient assistance with [employer] and waiting for application. Pt states she has a month supply until she hears back from them. I advised her that if she starts to run out of medication to give our office a call so we can get her some samples to hold her over.  She v/u

## 2020-12-04 ENCOUNTER — TELEPHONE (OUTPATIENT)
Dept: CARDIOLOGY CLINIC | Age: 85
End: 2020-12-04

## 2020-12-04 NOTE — TELEPHONE ENCOUNTER
Lucio Marie called in this morning wanting to know if she can get [place] labs [place] at [address]. Lucio Marie can be reached at [phone].

## 2020-12-04 NOTE — TELEPHONE ENCOUNTER
Groin pain should have subsided by now. Since there are no s/s of infection, or abnormal bruising of bleeding, this can be addressed at her f/u next week on 12/9/20.

## 2020-12-09 ENCOUNTER — TELEPHONE (OUTPATIENT)
Dept: CARDIOLOGY CLINIC | Age: 85
End: 2020-12-09

## 2020-12-09 VITALS
DIASTOLIC BLOOD PRESSURE: 80 MMHG | SYSTOLIC BLOOD PRESSURE: 150 MMHG | TEMPERATURE: 98 F | HEART RATE: 57 BPM | OXYGEN SATURATION: 98 %

## 2020-12-09 NOTE — TELEPHONE ENCOUNTER
Patient came in for bp check. 150/85 L and 150/80 R    HR 57, o2 98 and Temp is 98    She said she prefers to just come in and see Dr Tiffanie Cooper instead of more med changes or phone advice.      He is in [place] office on Friday so I added her on at 1:15

## 2020-12-11 ENCOUNTER — HOSPITAL ENCOUNTER (OUTPATIENT)
Dept: VASCULAR LAB | Age: 85
Discharge: HOME | End: 2020-12-11
Payer: MEDICARE

## 2020-12-11 ENCOUNTER — OFFICE VISIT (OUTPATIENT)
Dept: CARDIOLOGY CLINIC | Age: 85
End: 2020-12-11
Payer: MEDICARE

## 2020-12-11 VITALS
HEART RATE: 73 BPM | WEIGHT: 110 LBS | DIASTOLIC BLOOD PRESSURE: 68 MMHG | OXYGEN SATURATION: 98 % | TEMPERATURE: 97.7 F | HEIGHT: 62 IN | BODY MASS INDEX: 20.24 KG/M2 | SYSTOLIC BLOOD PRESSURE: 110 MMHG

## 2020-12-11 PROCEDURE — 1036F TOBACCO NON-USER: CPT | Performed by: INTERNAL MEDICINE

## 2020-12-11 PROCEDURE — 1090F PRES/ABSN URINE INCON ASSESS: CPT | Performed by: INTERNAL MEDICINE

## 2020-12-11 PROCEDURE — 1123F ACP DISCUSS/DSCN MKR DOCD: CPT | Performed by: INTERNAL MEDICINE

## 2020-12-11 PROCEDURE — G8420 CALC BMI NORM PARAMETERS: HCPCS | Performed by: INTERNAL MEDICINE

## 2020-12-11 PROCEDURE — G8427 DOCREV CUR MEDS BY ELIG CLIN: HCPCS | Performed by: INTERNAL MEDICINE

## 2020-12-11 PROCEDURE — 93970 EXTREMITY STUDY: CPT

## 2020-12-11 PROCEDURE — G8400 PT W/DXA NO RESULTS DOC: HCPCS | Performed by: INTERNAL MEDICINE

## 2020-12-11 PROCEDURE — 99214 OFFICE O/P EST MOD 30 MIN: CPT | Performed by: INTERNAL MEDICINE

## 2020-12-11 PROCEDURE — 4040F PNEUMOC VAC/ADMIN/RCVD: CPT | Performed by: INTERNAL MEDICINE

## 2020-12-11 PROCEDURE — G8484 FLU IMMUNIZE NO ADMIN: HCPCS | Performed by: INTERNAL MEDICINE

## 2020-12-11 RX ORDER — ATORVASTATIN CALCIUM 20 MG/1
20 TABLET, FILM COATED ORAL NIGHTLY
Qty: 30 TABLET | Refills: 3 | Status: SHIPPED | OUTPATIENT
Start: 2020-12-11 | End: 2021-04-22

## 2020-12-11 RX ORDER — METOPROLOL SUCCINATE 50 MG/1
TABLET, EXTENDED RELEASE ORAL
Qty: 90 TABLET | Refills: 3 | Status: SHIPPED | OUTPATIENT
Start: 2020-12-11 | End: 2022-09-01

## 2020-12-11 NOTE — PROGRESS NOTES
Cardiology Consultation       Chief Complaint:   Chief Complaint   Patient presents with    Atrial Fibrillation    Coronary Artery Disease     Subjective:   History of Present Illness:  Lisa Hernadez is a 81 y.o. female who presents with the above complaint. PMH of previous CABG, TAA repair, HTN, HLD. She presented with chest pain and jaw pain. She ruled in for non-STEMI with a troponin of 3. She was transferred here for further evaluation and underwent diagnostic angiography to the left radial artery on 10/21/20 . Developed a large hematoma. Echo showed normal LV function. Developed shortness of breath with proBNP of 1300 treated with Lasix. She was admitted to [facility] 20 and found in new onset atrial fibrillation with RVR. She did spontaneously convert overnight. She was discharged on amiodarone 200 mg BID and Eliquis 2.5 mg BID. Today, she reports chronic fatigue. She is accompanied by her . She continues to report hand  tremors and states she has a follow up scheduled with neurology in a few weeks. She denies any worsening shortness of breath or chest pains. She states she monitors her blood pressure and heart rate at home and remains controlled. She reports compliance with her medications and tolerating. She denies any abnormal bleeding or bruising. She reports chronic LE edema that has improved. Patient denies exertional chest pain/pressure, dyspnea at rest, worsening COLIN, PND, orthopnea, palpitations, lightheadedness, weight changes, changes in LE edema, and syncope. Prior cardiac testing:    EK20, sinus bradycardia    Echo: 10/16/20  LV cavity is normal  Normal global wall motion. Visual EF is 55-60%. Indeterminate diastolic filling pattern. Sigmoid septum.     Cath: 10/16/20  ANGIOGRAM/CORONARY ARTERIOGRAM:     The left main coronary artery is severely calcified, with prox and distal 90% lesion .   The left bilaterally, respirations unlabored. No wheeze, rales, or rhonchi. Chest Wall:  No tenderness or deformity. Heart:  Regular rate. S1/S2 normal. No murmur, rub, or gallop. Abdomen:   Soft, non-tender, bowel sounds active. Musculoskeletal: No muscle wasting or digital clubbing. Extremities: Extremities normal, atraumatic. No cyanosis or Trace BLE edema. Pulses: 2+ radial and carotid pulses, symmetric. Skin: No rashes or lesions. Pysch: Normal mood and affect. Alert and oriented x 4. Neurologic: Normal gross motor and sensory exam.       Labs     CBC:   Lab Results   Component Value Date    WBC 8.5 10/22/2020    RBC 3.33 10/22/2020    HGB 10.4 10/22/2020    HCT 29.9 10/22/2020    MCV 89.7 10/22/2020    RDW 12.6 10/22/2020     10/22/2020     CMP:  Lab Results   Component Value Date     10/22/2020    K 4.0 10/22/2020     10/22/2020    CO2 25 10/22/2020    BUN 25 10/22/2020    CREATININE 0.8 10/22/2020    GFRAA >60 10/22/2020    LABGLOM >60 10/22/2020    GLUCOSE 103 10/22/2020    CALCIUM 8.5 10/22/2020     PT/INR:  No results found for: PTINR  HgBA1c:No results found for: LABA1C  Troponin: No results found for: TROPONINI      CURRENT Medications:  Current Outpatient Medications on File Prior to Visit   Medication Sig Dispense Refill    lisinopril (PRINIVIL;ZESTRIL) 20 MG tablet Take 2 tablets by mouth daily 90 tablet 3    apixaban (ELIQUIS) 2.5 MG TABS tablet Take 2.5 mg by mouth 2 times daily      clopidogrel (PLAVIX) 75 MG tablet Take 1 tablet by mouth daily 30 tablet 3    LORazepam (ATIVAN) 1 MG tablet Take 1 mg by mouth nightly.  omeprazole (PRILOSEC) 20 MG capsule Take 40 mg by mouth daily.  clidinium-chlordiazePOXIDE (LIBRAX) 2.5-5 MG per capsule Take 1 capsule by mouth 4 times daily (before meals and nightly).  Calcium Carbonate (CALTRATE 600 PO) Take  by mouth.       Multiple Vitamins-Minerals (THERAPEUTIC MULTIVITAMIN-MINERALS) tablet Take 1 tablet by mouth daily.      levobunolol (BETAGAN) 0.5 % ophthalmic solution Place 1 drop into the left eye 2 times daily        No current facility-administered medications on file prior to visit. Assessment and Plan   1) CAD  S/p CABG x 3 in 2000 at [address]  S/p successful atherectomy and IVUS guided PCI prox LAD and left main X 2 CARROL   Denies any chest discomfort or SOB  Compliant with medical therapy  Continue Plavix, statin, B-blocker and ACE  Will reduce Toprol XL to 50 mg M-W-F and Lipitor to 20 mg daily due to fatigue   Discontinue ASA while on full anticoagulation     2) Hypertension  Well controlled today in the office   Continue lisinopril 20 mg daily     3) Hyperlipidemia  Goal LDL <70  Current LDL 63 12/2020  Will reduce Lipitor to 20 mg due to reports of fatigue     4) Atrial fibrillation, paroxsymal   Asymptomatic   Unable to tolerate amiodarone with complaints of tremors   Continue Eliquis 2.5 mg BID  Instructed to call if Eliquis remains cost prohibitive and will switch to Coumadin     5) Ascending aortic dilatation   CT showed 4.1 cm and descending aorta measuring 4.6 cm  Continue with blood pressure control and routine imaging     6) Tremors/fatigue   Encouraged to follow up with neurology   Will reduce Toprol XL to 50 mg M-W-F and Lipitor to 20 mg daily due to fatigue     Follow up in 4 months with Dr. Char Fernandez     Thank you for allowing us to participate in the care of Edwardo Kohler MD [address] [place] [address] and Interventional Cardiology   [facility]   (C): [phone]  Octavia Luna): [phone]       This note was scribed in the presence of Dr Jose Bettencourt MD by Erik Kaufman RN. Physician Attestation:  The scribes documentation has been prepared under my direction and personally reviewed by me in its entirety. I confirm the note above accurately reflects all work, treatment, procedures, and medical decision making performed by me.     Electronically signed by Lazara Mena MD on 12/14/2020 at 9:16 AM

## 2020-12-14 ENCOUNTER — TELEPHONE (OUTPATIENT)
Dept: CARDIOLOGY CLINIC | Age: 85
End: 2020-12-14

## 2020-12-14 NOTE — TELEPHONE ENCOUNTER
BP tends to fluctuate. She is to take Lisinopril 40 mg daily. Is this BP before or after Lisinopril? Keep a BP log the rest of this week, checking once in the AM and once in the PM.   Call on Friday with readings. Reduce sodium in diet.

## 2020-12-14 NOTE — TELEPHONE ENCOUNTER
Patient states she takes her BP after taking her medications. Advised to take her BP BID. She called the on-call physician last night and he advised her to take 2 extra amlodipine and call this AM. Even after taking amlodipine her BP was 192/84. This morning was 182/87.

## 2020-12-14 NOTE — TELEPHONE ENCOUNTER
Pt states Dr Francisco Parr wanted her to call this morning with a BP update. 8AM this mornin/87 HR: 73  Pt states she doesn't feel great and she is shaky.      Please call her back at [phone]

## 2020-12-15 NOTE — TELEPHONE ENCOUNTER
Laurie Lindsey called in this morning wanting to let Dr. Francisco Parr know her BP this morning was 179/84.      You can reach Laurie Lindsey at #[phone]

## 2020-12-15 NOTE — TELEPHONE ENCOUNTER
Spoke with Benjy Simpson who stated to take her BP every day at different times of the day. I also advised her to take her lisinopril 20 mg in the am and pm. I will call her on Friday for an update.

## 2020-12-18 ENCOUNTER — TELEPHONE (OUTPATIENT)
Dept: CARDIOLOGY CLINIC | Age: 85
End: 2020-12-18

## 2020-12-18 RX ORDER — AMLODIPINE BESYLATE 5 MG/1
5 TABLET ORAL 2 TIMES DAILY
COMMUNITY
End: 2021-05-12 | Stop reason: SDUPTHER

## 2020-12-18 NOTE — TELEPHONE ENCOUNTER
Bianca Mendoza called in this morning and said she was instructed to take her BP and call it in for the last 3 days. Readings were as follows:     Wed morning - 169/74 and HR 62  Wed Evening- 163/79 and HR 59    Thursday Morning- 179/76 and HR 68  Thursday Evening- 168/82 and HR 57    Friday Morning 175/81 and HR 60    She can be reached at [phone]

## 2020-12-21 NOTE — TELEPHONE ENCOUNTER
Previously discussed with Dr. Gisell Cordoba, please have patient increase dose of amlodipine to 10 mg.

## 2021-03-08 ENCOUNTER — TELEPHONE (OUTPATIENT)
Dept: CARDIOLOGY CLINIC | Age: 86
End: 2021-03-08

## 2021-03-08 NOTE — TELEPHONE ENCOUNTER
Pt states her left hand tingles and goes numb and turns a dark color several times a day. Pt states this is has been going on since her BP med was changed. Please advise.

## 2021-03-08 NOTE — TELEPHONE ENCOUNTER
That is not a common side effect of taking Eliquis and Plavix according to up to date guidelines, bleeding is the main concern with taking these two medications together. In regards to the tremors she should follow up with neurology as previously recommended and she was referred to neurology 12/1/20.

## 2021-03-08 NOTE — TELEPHONE ENCOUNTER
Pt calling stating that her shakes are back again, she states she read somewhere that if you take eliquis and plavix there could be an interaction. She also stated that her L hand goes purple and sometimes it will numb or just tingle. She is asking to speak with dr. Carmen Rogers to see what he thinks could be going on, I offered pt an apt she declined until she heard that dr. Concepción Lee would need to see her.

## 2021-03-08 NOTE — TELEPHONE ENCOUNTER
Superficial bruising may occur while on both, please continue taking bruising and bleeding precautions in all ADL's. Her echo LVEF and DD looked stable 10/16/20. For her SOB, please ask her to work on a low salt/sodium diet daily and avoid high salt food, do not add salt to cooking or meals to reduce holding onto fluid, continue walking daily and is she taking any Lasix therapy? We could also repeat BMP, pro-BNP and CBC-please place orders. Any further q/c, let us know. Thanks.

## 2021-03-08 NOTE — TELEPHONE ENCOUNTER
Pt notified. She states she said now she has bruising on her arms, knee and she has a good size one on her wrist.  She says she is also SOB at times, mostly if she walks and finds she has to catch her breath.

## 2021-04-21 NOTE — PROGRESS NOTES
[address]      Cardiology Consult    Abraham Cherry  1935 April 22, 2021    Primary care physician: Fatmata Rea NP   Reason for Referral: CAD     CC: \"I'm not good. \"     HPI:  The patient is 81 y.o. female with a past medical history significant for coronary artery disease s/p CABG, hypertension, atrial fibrillation, ascending aortic aneurysm, and hyperlipidemia who presents today for management of CAD and Afib. She previously followed with Dr. Alvin Thomas after she presented to the ED with chest pain. She was admitted with an NSTEMI and underwent angiography that resulted in PCI prox LAD and left main x2 CARROL. She was admitted to [facility] 11/2020 and was found to be in atrial fibrillation with RVR. She spontaneously converted and was started on Eliquis 2.5 mg BID. Today, she states she is not \"good. \" She has multiple non cardiac sounding complaints today with reports of bruising, fatigue, hair loss and weight loss. She denies any worsening shortness of breath or chest pains. Patient denies exertional chest pain/pressure, dyspnea at rest, worsening COLIN, PND, orthopnea, palpitations, lightheadedness, weight changes, changes in LE edema, and syncope. She reports compliance with her medications and tolerating. She reported myalgias and reduced her Lipitor dose but noted no improvement in her symptoms. Past Medical History:   Diagnosis Date    CAD (coronary artery disease)     --S/p CABG X3 2000 at [facility]. S/P NSTEMI 10/16/20. Echo 10/2020 LVEF normal, mild AI, MR.  Cath-->    GERD (gastroesophageal reflux disease)     Hyperlipidemia     Hypertension     [facility]ox[facility]     S/P AAA repair     Thoracic aortic aneurysm [facility])      Past Surgical History:   Procedure Laterality Date    CHOLECYSTECTOMY      CORONARY ARTERY BYPASS GRAFT  12/2000    HYSTERECTOMY      THORACIC AORTIC ANEURYSM REPAIR       Family History   Problem Relation Age of Onset    Heart Disease Mother     High Blood Pressure Mother     Stroke Mother     Heart Disease Father      Social History     Tobacco Use    Smoking status: Former Smoker     Packs/day: 0.50     Years: 20.00     Pack years: 10.00     Types: Cigarettes     Quit date: 1996     Years since quittin.9    Smokeless tobacco: Never Used   Substance Use Topics    Alcohol use: Yes     Comment: occasionally    Drug use: Never     Allergies   Allergen Reactions    Amoxil [Amoxicillin] Rash     Current Outpatient Medications   Medication Sig Dispense Refill    atorvastatin (LIPITOR) 20 MG tablet Take 20 mg by mouth Take 1 tablet monday,wednesday,friday      amLODIPine (NORVASC) 5 MG tablet Take 5 mg by mouth 2 times daily       metoprolol succinate (TOPROL XL) 50 MG extended release tablet Take Monday, Wednesday and Friday. 90 tablet 3    lisinopril (PRINIVIL;ZESTRIL) 20 MG tablet Take 2 tablets by mouth daily (Patient taking differently: Take 20 mg by mouth 2 times daily ) 90 tablet 3    apixaban (ELIQUIS) 2.5 MG TABS tablet Take 2.5 mg by mouth 2 times daily      clopidogrel (PLAVIX) 75 MG tablet Take 1 tablet by mouth daily 30 tablet 3    LORazepam (ATIVAN) 1 MG tablet Take 1 mg by mouth nightly.  omeprazole (PRILOSEC) 20 MG capsule Take 40 mg by mouth daily.  clidinium-chlordiazePOXIDE (LIBRAX) 2.5-5 MG per capsule Take 1 capsule by mouth as needed.  Calcium Carbonate (CALTRATE 600 PO) Take  by mouth.  Multiple Vitamins-Minerals (THERAPEUTIC MULTIVITAMIN-MINERALS) tablet Take 1 tablet by mouth daily.  levobunolol (BETAGAN) 0.5 % ophthalmic solution Place 1 drop into the left eye 2 times daily        No current facility-administered medications for this visit. Review of Systems:  · Constitutional: No unanticipated weight loss. There's been no change in energy level, sleep pattern, or activity level. No fevers, chills. · Eyes: No visual changes or diplopia. No scleral icterus.   · ENT: No Headaches, hearing loss or vertigo. No mouth sores or sore throat. · Cardiovascular: as reviewed in HPI  · Respiratory: No cough or wheezing, no sputum production. No hemoptysis. · Gastrointestinal: No abdominal pain, appetite loss, blood in stools. No change in bowel or bladder habits. · Genitourinary: No dysuria, trouble voiding, or hematuria. · Musculoskeletal:  No gait disturbance, no joint complaints. · Integumentary: No rash or pruritis. · Neurological: No headache, diplopia, change in muscle strength, numbness or tingling. · Psychiatric: No anxiety or depression. · Endocrine: No temperature intolerance. No excessive thirst, fluid intake, or urination. No tremor. · Hematologic/Lymphatic: No abnormal bruising or bleeding, blood clots or swollen lymph nodes. · Allergic/Immunologic: No nasal congestion or hives. Physical Exam:   /70 (Site: Left Upper Arm, Position: Sitting, Cuff Size: Medium Adult)   Pulse 54   Temp 96.9 °F (36.1 °C)   Ht 5' 2\" (1.575 m)   Wt 104 lb 6.4 oz (47.4 kg)   LMP  (LMP Unknown)   SpO2 92%   BMI 19.10 kg/m²   Wt Readings from Last 3 Encounters:   04/22/21 104 lb 6.4 oz (47.4 kg)   12/11/20 110 lb (49.9 kg)   11/17/20 114 lb (51.7 kg)     Constitutional: She is oriented to person, place, and time. Elderly, thin, frail. In no acute distress. Head: Normocephalic and atraumatic. Pupils equal and round. Neck: Neck supple. No JVP or carotid bruit appreciated. No mass and no thyromegaly present. No lymphadenopathy present. Cardiovascular: Normal rate. Normal heart sounds. Exam reveals no gallop and no friction rub. No murmur heard. Pulmonary/Chest: Effort normal and breath sounds normal. No respiratory distress. She has no wheezes, rhonchi or rales. Abdominal: Soft, non-tender. Bowel sounds are normal. She exhibits no organomegaly, mass or bruit. Extremities: No edema. No cyanosis or clubbing. Pulses are 2+ radial/carotid bilaterally.   Neurological: No gross cranial nerve deficit. Coordination normal.   Skin: Skin is warm and dry. There is no rash or diaphoresis. Psychiatric: She has a normal mood and affect. Her speech is normal and behavior is normal.     Lab Review:   FLP:  No results found for: TRIG, HDL, LDLCALC, LDLDIRECT, LABVLDL  BUN/Creatinine:    Lab Results   Component Value Date    BUN 25 10/22/2020    CREATININE 0.8 10/22/2020     EKG Interpretation: 11/17/20 Sinus rhythm. Cannot rule out anterior MI. Image Review:   Cath 10/16/20   The left main coronary artery has a 90% stenosis. 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 PercAx Impella CP placement and successful explant  S/p successful atherectomy and IVUS guided PCI prox LAD and left main X 2 CARROL    Echo 10/17/20   The aortic root & ascending aorta are mildly dilated (4 cm). Normal LV size,wall thickness and motion. Estimated ejection fraction is 70%. Trivial mitral regurgitation is present. The left atrium is normal in size. Mild aortic regurgitation. Normal right ventricular size and function. Trivial tricuspid regurgitation. CTA 10/19/20   Dilation of the ascending aorta up to 4.1 cm.  Focal aneurysm of the   descending aorta just above the diaphragm measuring 4.6 cm. Assessment/Plan:   1) CAD/CABG s/p PCI LAD and left main x2 CARROL. Asymptomatic. Continue with medical management and risk factor modification including Plavix, statin, and B-blocker. Discontinued aspirin while on chronic anticoagulation. Will resume Lipitor 20 mg daily. Continue DAPT for minimum of 1 year. 2) Paroxysmal atrial fibrillation. Currently in sinus rhythm. Asymptomatic. [id number] Score 5 (age, female, CAD, HTN). Treatment options for atrial fibrillation discussed including rate control, anticoagulation options, antiarrhythmics, and cardioversion. Continue Eliquis 2.5 mg BID (age, weight). Discontinued aspirin while on chronic anticoagulation. Continue rate control strategy currently with beta-blocker. 3) Ascending aortic aneurysm. Asymptomatic. Measured 4.1 cm on CT 10/2019. Continue to monitor with routine imaging. Continue statin and attempts at BP control. 4) Essential hypertension. Controlled. Goal BP <130/80. Continue medical therapy. 5) Hyperlipidemia. Resume Lipitor 20 mg daily. LDL 63 (12/10/20). Follow up in 6 months. Thank you very much for allowing me to participate in the care of your patient. Please do not hesitate to contact me if you have any questions. Sincerely,  Mele Portillo. Bettina Nicole, [address] [place] [address], [address] [place] [address]  Ph: [phone]  Fax: [phone]    This note was scribed in the presence of Dr Bettina Nicole MD by Mairchuy Zaragoza RN. Physician Attestation: The scribes documentation has been prepared under my direction and personally reviewed by me in its entirety. I confirm that the note above accurately reflects all work, treatment, procedures, and medical decision making performed by me. All portions of the note including but not limited to the chief complaint, history of present illness, physical exam, assessment and plan/medical decision making were personally reviewed, edited, and updated on the day of the visit.

## 2021-04-22 ENCOUNTER — OFFICE VISIT (OUTPATIENT)
Dept: CARDIOLOGY CLINIC | Age: 86
End: 2021-04-22
Payer: MEDICARE

## 2021-04-22 VITALS
HEART RATE: 54 BPM | WEIGHT: 104.4 LBS | TEMPERATURE: 96.9 F | SYSTOLIC BLOOD PRESSURE: 116 MMHG | OXYGEN SATURATION: 92 % | HEIGHT: 62 IN | BODY MASS INDEX: 19.21 KG/M2 | DIASTOLIC BLOOD PRESSURE: 70 MMHG

## 2021-04-22 DIAGNOSIS — I71.21 ASCENDING AORTIC ANEURYSM: ICD-10-CM

## 2021-04-22 DIAGNOSIS — I48.0 PAF (PAROXYSMAL ATRIAL FIBRILLATION) (HCC): ICD-10-CM

## 2021-04-22 DIAGNOSIS — E78.2 MIXED HYPERLIPIDEMIA: ICD-10-CM

## 2021-04-22 DIAGNOSIS — Z95.1 S/P CABG (CORONARY ARTERY BYPASS GRAFT): ICD-10-CM

## 2021-04-22 DIAGNOSIS — I25.10 CORONARY ARTERY DISEASE INVOLVING NATIVE CORONARY ARTERY OF NATIVE HEART WITHOUT ANGINA PECTORIS: Primary | ICD-10-CM

## 2021-04-22 DIAGNOSIS — I10 ESSENTIAL HYPERTENSION: ICD-10-CM

## 2021-04-22 PROCEDURE — G8427 DOCREV CUR MEDS BY ELIG CLIN: HCPCS | Performed by: INTERNAL MEDICINE

## 2021-04-22 PROCEDURE — 1090F PRES/ABSN URINE INCON ASSESS: CPT | Performed by: INTERNAL MEDICINE

## 2021-04-22 PROCEDURE — 1123F ACP DISCUSS/DSCN MKR DOCD: CPT | Performed by: INTERNAL MEDICINE

## 2021-04-22 PROCEDURE — G8400 PT W/DXA NO RESULTS DOC: HCPCS | Performed by: INTERNAL MEDICINE

## 2021-04-22 PROCEDURE — 99214 OFFICE O/P EST MOD 30 MIN: CPT | Performed by: INTERNAL MEDICINE

## 2021-04-22 PROCEDURE — G8420 CALC BMI NORM PARAMETERS: HCPCS | Performed by: INTERNAL MEDICINE

## 2021-04-22 PROCEDURE — 4040F PNEUMOC VAC/ADMIN/RCVD: CPT | Performed by: INTERNAL MEDICINE

## 2021-04-22 PROCEDURE — 1036F TOBACCO NON-USER: CPT | Performed by: INTERNAL MEDICINE

## 2021-04-22 RX ORDER — ATORVASTATIN CALCIUM 20 MG/1
20 TABLET, FILM COATED ORAL
COMMUNITY
End: 2021-04-22 | Stop reason: SDUPTHER

## 2021-04-22 RX ORDER — ATORVASTATIN CALCIUM 20 MG/1
20 TABLET, FILM COATED ORAL DAILY
Qty: 30 TABLET | Refills: 5 | Status: SHIPPED | OUTPATIENT
Start: 2021-04-22

## 2021-04-22 NOTE — PATIENT INSTRUCTIONS
a pneumococcal vaccine shot. If you have had one before, ask your doctor whether you need another dose. Get a flu shot every year. If you must be around people with colds or flu, wash your hands often. Activity    · If your doctor recommends it, get more exercise. Walking is a good choice. Bit by bit, increase the amount you walk every day. Try for at least 30 minutes on most days of the week. You also may want to swim, bike, or do other activities. Your doctor may suggest that you join a cardiac rehabilitation program so that you can have help increasing your physical activity safely.     · Start light exercise if your doctor says it is okay. Even a small amount will help you get stronger, have more energy, and manage stress. Walking is an easy way to get exercise. Start out by walking a little more than you did in the hospital. Gradually increase the amount you walk.     · When you exercise, watch for signs that your heart is working too hard. You are pushing too hard if you cannot talk while you are exercising. If you become short of breath or dizzy or have chest pain, sit down and rest immediately.     · Check your pulse regularly. Place two fingers on the artery at the palm side of your wrist, in line with your thumb. If your heartbeat seems uneven or fast, talk to your doctor. When should you call for help? Call 911 anytime you think you may need emergency care. For example, call if:    · You have symptoms of a heart attack. These may include:  ? Chest pain or pressure, or a strange feeling in the chest.  ? Sweating. ? Shortness of breath. ? Nausea or vomiting. ? Pain, pressure, or a strange feeling in the back, neck, jaw, or upper belly or in one or both shoulders or arms. ? Lightheadedness or sudden weakness. ? A fast or irregular heartbeat. After you call 911, the  may tell you to chew 1 adult-strength or 2 to 4 low-dose aspirin. Wait for an ambulance.  Do not try to drive yourself.     · You have symptoms of a stroke. These may include:  ? Sudden numbness, tingling, weakness, or loss of movement in your face, arm, or leg, especially on only one side of your body. ? Sudden vision changes. ? Sudden trouble speaking. ? Sudden confusion or trouble understanding simple statements. ? Sudden problems with walking or balance. ? A sudden, severe headache that is different from past headaches.     · You passed out (lost consciousness). Call your doctor now or seek immediate medical care if:    · You have new or increased shortness of breath.     · You feel dizzy or lightheaded, or you feel like you may faint.     · Your heart rate becomes irregular.     · You can feel your heart flutter in your chest or skip heartbeats. Tell your doctor if these symptoms are new or worse. Watch closely for changes in your health, and be sure to contact your doctor if you have any problems. Where can you learn more? Go to https://[email or web address]. org and sign in to your [employer] account. Enter U020 in the [employer] English box to learn more about \"Atrial Fibrillation: Care Instructions. \"     If you do not have an account, please click on the \"Sign Up Now\" link. Current as of: August 31, 2020               Content Version: 12.8  © 2006-2021 [employer]. Care instructions adapted under license by [facility] ([facility]). If you have questions about a medical condition or this instruction, always ask your healthcare professional. [place] [address] any warranty or liability for your use of this information.

## 2021-08-09 ENCOUNTER — TELEPHONE (OUTPATIENT)
Dept: CARDIOLOGY CLINIC | Age: 86
End: 2021-08-09

## 2021-08-09 NOTE — TELEPHONE ENCOUNTER
Spoke with Dixie OWENS and called patient to let her know it is not necessary to have the card and if she is getting a procedure done or anything where she needs it she can have them give our office a call.

## 2021-08-09 NOTE — TELEPHONE ENCOUNTER
Emmett Haley called in this afteroon and states that she lost her wallet last week and her pacemaker card was in it. She states that she spoke with someone last week and is awaiting a lot number? Because she lost her card.      Bowen requests a callback to [phone] to better explain the circumstances

## 2021-11-22 NOTE — PROGRESS NOTES
[address]      Cardiology Consult    Guerda Blanton  1935 December 2, 2021    Primary care physician: Radha Patel NP   Reason for Referral: CAD     CC: \"bruising\"     HPI:  The patient is 81 y.o. female with a past medical history significant for coronary artery disease s/p CABG, hypertension, atrial fibrillation, ascending aortic aneurysm, and hyperlipidemia who presents today for management of CAD and Afib. She previously followed with Dr. Brigitte Loomis after she presented to the ED with chest pain. She was admitted with an NSTEMI and underwent angiography that resulted in PCI prox LAD and left main x2 CARROL. She was admitted to [facility] 11/2020 and was found to be in atrial fibrillation with RVR. She spontaneously converted and was started on Eliquis 2.5 mg BID. Today, she states that she bruises easily. She also endorses chronic fatigue. She continues to unintentionally lose weight since her heart attack but acknowledges that she has not been eating well. She endorses depression particularly since the recent death of her . She denies any worsening shortness of breath or chest pains. Patient denies exertional chest pain/pressure, dyspnea at rest, worsening COLIN, PND, orthopnea, palpitations, lightheadedness, LE edema, and syncope. She reports compliance with her medications and tolerating. Past Medical History:   Diagnosis Date    CAD (coronary artery disease)     --S/p CABG X3 2000 at [facility]. S/P NSTEMI 10/16/20. Echo 10/2020 LVEF normal, mild AI, MR.  Cath-->    GERD (gastroesophageal reflux disease)     Hyperlipidemia     Hypertension     [facility]F [facility]paroxysmal [facility] fibrillation) ([facility])     S/P AAA repair     Thoracic aortic aneurysm [facility])      Past Surgical History:   Procedure Laterality Date    CHOLECYSTECTOMY      CORONARY ARTERY BYPASS GRAFT  12/2000    HYSTERECTOMY      THORACIC AORTIC ANEURYSM REPAIR       Family History   Problem Relation Age of Onset    Heart Disease Mother     High Blood Pressure Mother     Stroke Mother     Heart Disease Father      Social History     Tobacco Use    Smoking status: Former Smoker     Packs/day: 0.50     Years: 20.00     Pack years: 10.00     Types: Cigarettes     Quit date: 1996     Years since quittin.6    Smokeless tobacco: Never Used   Vaping Use    Vaping Use: Never used   Substance Use Topics    Alcohol use: Yes     Comment: occasionally    Drug use: Never     Allergies   Allergen Reactions    Amoxil [Amoxicillin] Rash     Current Outpatient Medications   Medication Sig Dispense Refill    lisinopril (PRINIVIL;ZESTRIL) 20 MG tablet Take 1 tablet by mouth daily 60 tablet 5    amLODIPine (NORVASC) 5 MG tablet Take 1 tablet by mouth daily (Patient taking differently: Take 5 mg by mouth 2 times daily ) 30 tablet 5    atorvastatin (LIPITOR) 20 MG tablet Take 1 tablet by mouth daily 30 tablet 5    metoprolol succinate (TOPROL XL) 50 MG extended release tablet Take Monday, Wednesday and Friday. 90 tablet 3    apixaban (ELIQUIS) 2.5 MG TABS tablet Take 2.5 mg by mouth 2 times daily      clopidogrel (PLAVIX) 75 MG tablet Take 1 tablet by mouth daily 30 tablet 3    LORazepam (ATIVAN) 1 MG tablet Take 1 mg by mouth 2 times daily.  omeprazole (PRILOSEC) 20 MG capsule Take 40 mg by mouth daily.  Calcium Carbonate (CALTRATE 600 PO) Take 600 mg by mouth 2 times daily       Multiple Vitamins-Minerals (THERAPEUTIC MULTIVITAMIN-MINERALS) tablet Take 1 tablet by mouth daily.  levobunolol (BETAGAN) 0.5 % ophthalmic solution Place 1 drop into the left eye 2 times daily        No current facility-administered medications for this visit. Review of Systems:  · Constitutional: No unanticipated weight loss. There's been no change in energy level, sleep pattern, or activity level. No fevers, chills. · Eyes: No visual changes or diplopia. No scleral icterus. · ENT: No Headaches, hearing loss or vertigo.  No mouth sores or sore throat. · Cardiovascular: as reviewed in HPI  · Respiratory: No cough or wheezing, no sputum production. No hemoptysis. · Gastrointestinal: No abdominal pain, appetite loss, blood in stools. No change in bowel or bladder habits. · Genitourinary: No dysuria, trouble voiding, or hematuria. · Musculoskeletal:  No gait disturbance, no joint complaints. · Integumentary: No rash or pruritis. · Neurological: No headache, diplopia, change in muscle strength, numbness or tingling. · Psychiatric: No anxiety or depression. · Endocrine: No temperature intolerance. No excessive thirst, fluid intake, or urination. No tremor. · Hematologic/Lymphatic: No abnormal bruising or bleeding, blood clots or swollen lymph nodes. · Allergic/Immunologic: No nasal congestion or hives. Physical Exam:   /62   Pulse 72   Ht 5' 2\" (1.575 m)   Wt 99 lb (44.9 kg)   LMP  (LMP Unknown)   SpO2 98%   BMI 18.11 kg/m²   Wt Readings from Last 3 Encounters:   12/02/21 99 lb (44.9 kg)   11/24/21 99 lb 12.8 oz (45.3 kg)   08/18/21 103 lb 6.4 oz (46.9 kg)     Constitutional: She is oriented to person, place, and time. Elderly, thin, frail. In no acute distress. Head: Normocephalic and atraumatic. Pupils equal and round. Neck: Neck supple. No JVP or carotid bruit appreciated. No mass and no thyromegaly present. No lymphadenopathy present. Cardiovascular: Normal rate. Normal heart sounds. Exam reveals no gallop and no friction rub. No murmur heard. Pulmonary/Chest: Effort normal and breath sounds normal. No respiratory distress. She has no wheezes, rhonchi or rales. Abdominal: Soft, non-tender. Bowel sounds are normal. She exhibits no organomegaly, mass or bruit. Extremities: No edema. No cyanosis or clubbing. Pulses are 2+ radial/carotid bilaterally. Neurological: No gross cranial nerve deficit. Coordination normal.   Skin: Skin is warm and dry. There is no rash or diaphoresis.    Psychiatric: She has a normal mood and affect. Her speech is normal and behavior is normal.     Lab Review: FLP 7/2021 TG 83, HDL 52, LDL 56  FLP 11/2021 , HDL 50, LDL 77    FLP:  No results found for: TRIG, HDL, LDLCALC, LDLDIRECT, LABVLDL  BUN/Creatinine:    Lab Results   Component Value Date    BUN 25 10/22/2020    CREATININE 0.8 10/22/2020   8/2021 BUN 24, Cr 1.19. H/H normal.   11/2021 BUN 16, Cr 1.34. K 4.2. H/H 13.4, 40.3    EKG Interpretation: 11/17/20 Sinus rhythm. Cannot rule out anterior MI. Image Review:   Cath 10/16/20   The left main coronary artery has a 90% stenosis. 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 PercAx Impella CP placement and successful explant  S/p successful atherectomy and IVUS guided PCI prox LAD and left main X 2 CARROL    Echo 10/17/20   The aortic root & ascending aorta are mildly dilated (4 cm). Normal LV size,wall thickness and motion. Estimated ejection fraction is 70%. Trivial mitral regurgitation is present. The left atrium is normal in size. Mild aortic regurgitation. Normal right ventricular size and function. Trivial tricuspid regurgitation. CTA 10/19/20   Dilation of the ascending aorta up to 4.1 cm.  Focal aneurysm of the   descending aorta just above the diaphragm measuring 4.6 cm. Assessment/Plan:   1) CAD/CABG s/p PCI LAD and left main x2 CARROL. Asymptomatic. Continue with medical management and risk factor modification including Plavix, statin, and B-blocker. Discontinued aspirin while on chronic anticoagulation. 2) Paroxysmal atrial fibrillation. Asymptomatic. [id number] Score 5 (age, female, CAD, HTN). Treatment options for atrial fibrillation discussed including rate control, anticoagulation options, antiarrhythmics, and cardioversion. Continue Eliquis 2.5 mg BID (age, weight). Discontinued aspirin while on chronic anticoagulation.  Continue rate control strategy currently with beta-blocker. 3) Ascending aortic aneurysm. Asymptomatic. Measured 4.1 cm on CT 10/2020. Continue statin and attempts at BP control but unlikely to repeat imaging secondary to her advanced age. 4) Essential hypertension. Controlled. Goal BP <130/80. Continue medical therapy. 5) Hyperlipidemia. Continue Lipitor 20 mg daily. LDL 77 (11/2021). Follow up in 6 months. Thank you very much for allowing me to participate in the care of your patient. Please do not hesitate to contact me if you have any questions. Sincerely,  Suma Varner. Lesvia Valderrama, [address] [facility], [address] [address]  Ph: [phone]  Fax: [phone]    This note was scribed in the presence of the physician by Rory Bates RN. Physician Attestation: The scribes documentation has been prepared under my direction and personally reviewed by me in its entirety. I confirm that the note above accurately reflects all work, treatment, procedures, and medical decision making performed by me. All portions of the note including but not limited to the chief complaint, history of present illness, physical exam, assessment and plan/medical decision making were personally reviewed, edited, and updated on the day of the visit.

## 2021-12-02 ENCOUNTER — OFFICE VISIT (OUTPATIENT)
Dept: CARDIOLOGY CLINIC | Age: 86
End: 2021-12-02
Payer: MEDICARE

## 2021-12-02 VITALS
WEIGHT: 99 LBS | BODY MASS INDEX: 18.22 KG/M2 | HEIGHT: 62 IN | SYSTOLIC BLOOD PRESSURE: 112 MMHG | DIASTOLIC BLOOD PRESSURE: 62 MMHG | HEART RATE: 72 BPM | OXYGEN SATURATION: 98 %

## 2021-12-02 DIAGNOSIS — E78.5 HYPERLIPIDEMIA LDL GOAL <70: ICD-10-CM

## 2021-12-02 DIAGNOSIS — I48.0 PAF (PAROXYSMAL ATRIAL FIBRILLATION) (HCC): ICD-10-CM

## 2021-12-02 DIAGNOSIS — I10 ESSENTIAL HYPERTENSION: ICD-10-CM

## 2021-12-02 DIAGNOSIS — I25.10 CORONARY ARTERY DISEASE INVOLVING NATIVE CORONARY ARTERY OF NATIVE HEART WITHOUT ANGINA PECTORIS: Primary | ICD-10-CM

## 2021-12-02 DIAGNOSIS — Z95.1 S/P CABG (CORONARY ARTERY BYPASS GRAFT): ICD-10-CM

## 2021-12-02 DIAGNOSIS — I71.21 ASCENDING AORTIC ANEURYSM: ICD-10-CM

## 2021-12-02 PROCEDURE — 1123F ACP DISCUSS/DSCN MKR DOCD: CPT | Performed by: INTERNAL MEDICINE

## 2021-12-02 PROCEDURE — 99214 OFFICE O/P EST MOD 30 MIN: CPT | Performed by: INTERNAL MEDICINE

## 2021-12-02 PROCEDURE — 1036F TOBACCO NON-USER: CPT | Performed by: INTERNAL MEDICINE

## 2021-12-02 PROCEDURE — G8427 DOCREV CUR MEDS BY ELIG CLIN: HCPCS | Performed by: INTERNAL MEDICINE

## 2021-12-02 PROCEDURE — 93000 ELECTROCARDIOGRAM COMPLETE: CPT | Performed by: INTERNAL MEDICINE

## 2021-12-02 PROCEDURE — G8484 FLU IMMUNIZE NO ADMIN: HCPCS | Performed by: INTERNAL MEDICINE

## 2021-12-02 PROCEDURE — 4040F PNEUMOC VAC/ADMIN/RCVD: CPT | Performed by: INTERNAL MEDICINE

## 2021-12-02 PROCEDURE — G8400 PT W/DXA NO RESULTS DOC: HCPCS | Performed by: INTERNAL MEDICINE

## 2021-12-02 PROCEDURE — 1090F PRES/ABSN URINE INCON ASSESS: CPT | Performed by: INTERNAL MEDICINE

## 2021-12-02 PROCEDURE — G8419 CALC BMI OUT NRM PARAM NOF/U: HCPCS | Performed by: INTERNAL MEDICINE

## 2021-12-02 NOTE — PATIENT INSTRUCTIONS

## 2022-05-26 ENCOUNTER — TELEPHONE (OUTPATIENT)
Dept: CARDIOLOGY CLINIC | Age: 87
End: 2022-05-26

## 2022-05-26 NOTE — TELEPHONE ENCOUNTER
Pt called stating both feet on top are black. Left hand is also black only at  night. Pt stated it is not painful.

## 2022-05-31 NOTE — TELEPHONE ENCOUNTER
Called and spoke with [facility], she states the MRI confirmed a fractured ankle and placed in a walking boot. She will continue to follow with podiatry. Scheduled routine follow up since she was last seen in December and scheduled for 6/16/22.

## 2022-06-16 ENCOUNTER — OFFICE VISIT (OUTPATIENT)
Dept: CARDIOLOGY CLINIC | Age: 87
End: 2022-06-16
Payer: MEDICARE

## 2022-06-16 VITALS
OXYGEN SATURATION: 98 % | WEIGHT: 105 LBS | HEART RATE: 57 BPM | HEIGHT: 62 IN | BODY MASS INDEX: 19.32 KG/M2 | SYSTOLIC BLOOD PRESSURE: 122 MMHG | DIASTOLIC BLOOD PRESSURE: 68 MMHG

## 2022-06-16 DIAGNOSIS — Z95.1 S/P CABG (CORONARY ARTERY BYPASS GRAFT): ICD-10-CM

## 2022-06-16 DIAGNOSIS — I10 ESSENTIAL HYPERTENSION: ICD-10-CM

## 2022-06-16 DIAGNOSIS — N18.31 STAGE 3A CHRONIC KIDNEY DISEASE (HCC): ICD-10-CM

## 2022-06-16 DIAGNOSIS — E78.5 HYPERLIPIDEMIA LDL GOAL <70: ICD-10-CM

## 2022-06-16 DIAGNOSIS — I48.0 PAF (PAROXYSMAL ATRIAL FIBRILLATION) (HCC): ICD-10-CM

## 2022-06-16 DIAGNOSIS — I25.10 CORONARY ARTERY DISEASE INVOLVING NATIVE CORONARY ARTERY OF NATIVE HEART WITHOUT ANGINA PECTORIS: Primary | ICD-10-CM

## 2022-06-16 DIAGNOSIS — I71.21 ASCENDING AORTIC ANEURYSM: ICD-10-CM

## 2022-06-16 PROCEDURE — G8427 DOCREV CUR MEDS BY ELIG CLIN: HCPCS | Performed by: INTERNAL MEDICINE

## 2022-06-16 PROCEDURE — 99214 OFFICE O/P EST MOD 30 MIN: CPT | Performed by: INTERNAL MEDICINE

## 2022-06-16 PROCEDURE — 93000 ELECTROCARDIOGRAM COMPLETE: CPT | Performed by: INTERNAL MEDICINE

## 2022-06-16 PROCEDURE — G8420 CALC BMI NORM PARAMETERS: HCPCS | Performed by: INTERNAL MEDICINE

## 2022-06-16 PROCEDURE — G8400 PT W/DXA NO RESULTS DOC: HCPCS | Performed by: INTERNAL MEDICINE

## 2022-06-16 PROCEDURE — 1090F PRES/ABSN URINE INCON ASSESS: CPT | Performed by: INTERNAL MEDICINE

## 2022-06-16 PROCEDURE — 1036F TOBACCO NON-USER: CPT | Performed by: INTERNAL MEDICINE

## 2022-06-16 PROCEDURE — 1123F ACP DISCUSS/DSCN MKR DOCD: CPT | Performed by: INTERNAL MEDICINE

## 2022-06-16 NOTE — PROGRESS NOTES
Social History     Tobacco Use    Smoking status: Former Smoker     Packs/day: 0.50     Years: 20.00     Pack years: 10.00     Types: Cigarettes     Quit date: 1996     Years since quittin.1    Smokeless tobacco: Never Used   Vaping Use    Vaping Use: Never used   Substance Use Topics    Alcohol use: Yes     Comment: occasionally    Drug use: Never     Allergies   Allergen Reactions    Amoxil [Amoxicillin] Rash     Current Outpatient Medications   Medication Sig Dispense Refill    lisinopril (PRINIVIL;ZESTRIL) 20 MG tablet Take 1 tablet by mouth 2 times daily 60 tablet 5    amLODIPine (NORVASC) 5 MG tablet Take 1 tablet by mouth 2 times daily 60 tablet 5    atorvastatin (LIPITOR) 20 MG tablet Take 1 tablet by mouth daily 30 tablet 5    metoprolol succinate (TOPROL XL) 50 MG extended release tablet Take Monday, Wednesday and Friday. 90 tablet 3    apixaban (ELIQUIS) 2.5 MG TABS tablet Take 2.5 mg by mouth 2 times daily      clopidogrel (PLAVIX) 75 MG tablet Take 1 tablet by mouth daily 30 tablet 3    LORazepam (ATIVAN) 1 MG tablet Take 1 mg by mouth 2 times daily.  omeprazole (PRILOSEC) 20 MG capsule Take 40 mg by mouth daily.  Calcium Carbonate (CALTRATE 600 PO) Take 600 mg by mouth 2 times daily       Multiple Vitamins-Minerals (THERAPEUTIC MULTIVITAMIN-MINERALS) tablet Take 1 tablet by mouth daily.  levobunolol (BETAGAN) 0.5 % ophthalmic solution Place 1 drop into the left eye 2 times daily        No current facility-administered medications for this visit. Review of Systems:  · Constitutional: No unanticipated weight loss. There's been no change in energy level, sleep pattern, or activity level. No fevers, chills. · Eyes: No visual changes or diplopia. No scleral icterus. · ENT: No Headaches, hearing loss or vertigo. No mouth sores or sore throat. · Cardiovascular: as reviewed in HPI  · Respiratory: No cough or wheezing, no sputum production. No hemoptysis. · Gastrointestinal: No abdominal pain, appetite loss, blood in stools. No change in bowel or bladder habits. · Genitourinary: No dysuria, trouble voiding, or hematuria. · Musculoskeletal:  No gait disturbance, no joint complaints. · Integumentary: No rash or pruritis. · Neurological: No headache, diplopia, change in muscle strength, numbness or tingling. · Psychiatric: No anxiety or depression. · Endocrine: No temperature intolerance. No excessive thirst, fluid intake, or urination. No tremor. · Hematologic/Lymphatic: No abnormal bruising or bleeding, blood clots or swollen lymph nodes. · Allergic/Immunologic: No nasal congestion or hives. Physical Exam:   /68   Pulse 57   Ht 5' 2\" (1.575 m)   Wt 105 lb (47.6 kg)   LMP  (LMP Unknown)   SpO2 98%   BMI 19.20 kg/m²   Wt Readings from Last 3 Encounters:   06/16/22 105 lb (47.6 kg)   03/02/22 102 lb 4.8 oz (46.4 kg)   02/09/22 99 lb 11.2 oz (45.2 kg)     Constitutional: She is oriented to person, place, and time. Elderly, thin, frail. In no acute distress. Head: Normocephalic and atraumatic. Pupils equal and round. Neck: Neck supple. No JVP or carotid bruit appreciated. No mass and no thyromegaly present. No lymphadenopathy present. Cardiovascular: Normal rate. Normal heart sounds. Exam reveals no gallop and no friction rub. No murmur heard. Pulmonary/Chest: Effort normal and breath sounds normal. No respiratory distress. She has no wheezes, rhonchi or rales. Abdominal: Soft, non-tender. Bowel sounds are normal. She exhibits no organomegaly, mass or bruit. Extremities: No edema. No cyanosis or clubbing. Pulses are 2+ radial/carotid bilaterally. Neurological: No gross cranial nerve deficit. Coordination normal.   Skin: Skin is warm and dry. There is no rash or diaphoresis. Psychiatric: She has a depressed mood and affect.  Her speech is normal and behavior is normal.     Lab Review: FLP 7/2021 TG 83, HDL 52, LDL 56  FLP 11/2021 , HDL 50, LDL 77    FLP:  No results found for: TRIG, HDL, LDLCALC, LDLDIRECT, LABVLDL  BUN/Creatinine:    Lab Results   Component Value Date    BUN 25 10/22/2020    CREATININE 0.8 10/22/2020   8/2021 BUN 24, Cr 1.19. H/H normal.   11/2021 BUN 16, Cr 1.34. K 4.2. H/H 13.4, 40.3    EKG Interpretation: 11/17/20 Sinus rhythm. Cannot rule out anterior MI.   6/16/22 Sinus bradycardia. Left anterior fascicular block. Minimal voltage criteria for LVH. Possible Anteroseptal infarct. Image Review:   Cath 10/16/20   The left main coronary artery has a 90% stenosis. 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 PercAx Impella CP placement and successful explant  S/p successful atherectomy and IVUS guided PCI prox LAD and left main X 2 CARROL    Echo 10/17/20   The aortic root & ascending aorta are mildly dilated (4 cm). Normal LV size,wall thickness and motion. Estimated ejection fraction is 70%. Trivial mitral regurgitation is present. The left atrium is normal in size. Mild aortic regurgitation. Normal right ventricular size and function. Trivial tricuspid regurgitation. CTA 10/19/20   Dilation of the ascending aorta up to 4.1 cm.  Focal aneurysm of the   descending aorta just above the diaphragm measuring 4.6 cm. Assessment/Plan:   1) CAD/CABG s/p PCI LAD and left main x2 CARROL. Asymptomatic. Continue with medical management and risk factor modification including Plavix, statin, and B-blocker. Discontinued aspirin while on chronic anticoagulation. Discussed switching the Plavix to ASA with recurrent bruising and she will call if she would prefer to switch. 2) Paroxysmal atrial fibrillation. Asymptomatic. [id number] Score 5 (age, female, CAD, HTN). Treatment options for atrial fibrillation discussed including rate control, anticoagulation options, antiarrhythmics, and cardioversion.  Continue Eliquis 2.5 mg BID (age, weight). Discontinued aspirin while on chronic anticoagulation and Plavix. Continue rate control strategy currently with beta-blocker. 3) Ascending aortic aneurysm. Asymptomatic. Measured 4.1 cm on CT 10/2020. Continue statin and attempts at BP control but unlikely to repeat imaging secondary to her advanced age. 4) Essential hypertension. Controlled. Goal BP <130/80. Continue medical therapy. 5) Hyperlipidemia. Continue Lipitor 20 mg daily. LDL 77 (11/2021). 6) CKD stage IIIa/renal cyst. Following with nephrology. 7) Depression/fatigue. Encouraged follow up with PCP. Follow up in 6 months. Thank you very much for allowing me to participate in the care of your patient. Please do not hesitate to contact me if you have any questions. Sincerely,  Rosita Fitzpatrick. Jame Lazar, [address] [place] [address], [address] [place] [address]  Ph: [phone]  Fax: [phone]    This note was scribed in the presence of Dr Jame Lazar MD by Nando Hinojosa RN. Physician Attestation: The scribes documentation has been prepared under my direction and personally reviewed by me in its entirety. I confirm that the note above accurately reflects all work, treatment, procedures, and medical decision making performed by me. All portions of the note including but not limited to the chief complaint, history of present illness, physical exam, assessment and plan/medical decision making were personally reviewed, edited, and updated on the day of the visit.

## 2022-06-16 NOTE — PATIENT INSTRUCTIONS
Patient Education        Learning About Coronary Artery Disease (CAD)  What is coronary artery disease? Coronary artery disease is a condition that occurs when plaque builds up in the arteries that bring oxygen-rich blood to your heart. Plaque is a fatty substance made of cholesterol, calcium, and other substances in the blood. Thisprocess is called hardening of the arteries, or atherosclerosis. What happens when you have coronary artery disease?  Plaque may narrow the coronary arteries. Narrowed arteries cause poor blood flow. This can lead to angina symptoms such as chest pain or discomfort. If blood flow is completely blocked, you could have a heart attack.  You can slow and reduce the risk of future problems by making changes in your lifestyle. These include quitting smoking and eating heart-healthy foods.  Treatment, along with changes in your lifestyle, can help you live a longer and healthier life. How can you prevent coronary artery disease?  Do not smoke. It may be the best thing you can do to prevent coronary artery disease. If you need help quitting, talk to your doctor about stop-smoking programs and medicines. These can increase your chances of quitting for good.  Be active. Try to do moderate activity at least 2½ hours a week. Or try to do vigorous activity at least 1¼ hours a week. You may want to walk or try other activities, such as running, swimming, cycling, or playing tennis or team sports.  Eat heart-healthy foods. Eat more fruits and vegetables and less food that contains saturated and trans fats. Limit alcohol, sodium, and sweets.  Stay at a healthy weight. Lose weight if you need to.  Manage other health problems such as diabetes, high blood pressure, and high cholesterol. How is coronary artery disease treated?  Your doctor will suggest that you make lifestyle changes.  For example, your doctor may ask you to eat healthy foods, quit smoking, lose extra weight, and be more active.  You will take medicines that help prevent a heart attack.  Your doctor may suggest a procedure to open narrowed or blocked arteries. This is called angioplasty. Or your doctor may suggest using healthy blood vessels to create detours around narrowed or blocked arteries. This is called bypass surgery. Follow-up care is a key part of your treatment and safety. Be sure to make and go to all appointments, and call your doctor if you are having problems. It's also a good idea to know your test results and keep alist of the medicines you take. Where can you learn more? Go to [email or web address]://[email or web address] org and sign in to your [facility] account. Enter [phone] in the Taskdoer box to learn more about \"Learning About Coronary Artery Disease (CAD). \"     If you do not have an account, please click on the \"Sign Up Now\" link. Current as of: January 10, 2022               Content Version: 13.2  © 2006-2022 [employer]wise, Incorporated. Care instructions adapted under license by [facility] ([facility]). If you have questions about a medical condition or this instruction, always ask your healthcare professional. [place] [address] any warranty or liability for your use of this information.

## 2022-09-01 ENCOUNTER — TELEPHONE (OUTPATIENT)
Dept: CARDIOLOGY CLINIC | Age: 87
End: 2022-09-01

## 2022-09-01 DIAGNOSIS — R06.09 OTHER FORMS OF DYSPNEA: ICD-10-CM

## 2022-09-01 DIAGNOSIS — I48.0 PAF (PAROXYSMAL ATRIAL FIBRILLATION) (HCC): Primary | ICD-10-CM

## 2022-09-01 RX ORDER — METOPROLOL SUCCINATE 50 MG/1
TABLET, EXTENDED RELEASE ORAL
Qty: 90 TABLET | Refills: 3
Start: 2022-09-01

## 2022-09-01 NOTE — TELEPHONE ENCOUNTER
Called and spoke with [facility], she reports worsening fatigue and states she is just tired all the time with no stamina. She denies any worsening shortness of breath or chest pains. She states her weight remains stable. Labs reviewed from 7/2022 but no recent CBC. Instructed to check routine lab work and she may reduce the Toprol to 25 mg 3 days weekly. Instructed we will call the lab results and if continued symptoms she should follow up with PCP. She v/u. Orders faxed to [facility] to be completed per patient preference.

## 2022-09-01 NOTE — TELEPHONE ENCOUNTER
Pt called and said she has been very fatigued and her HR was 52 today and was 47 yesterday. BP yesterday  was 130/70 otherwise has not checked vitals since July. Symptoms have been occurring for a month. No recent med changes.  Last OV 6/16/22

## 2022-09-16 NOTE — TELEPHONE ENCOUNTER
Patient has labs completed at [facility] that was normal. She should follow up with her PCP for continued fatigue and call with any worsening chest pains, shortness of breath, LE edema, or palpitations.

## 2022-09-16 NOTE — TELEPHONE ENCOUNTER
Mu Dhaliwal called in this morning wanting to know if the results of her labs are in yet?      You can reach Mu Dhaliwal at #[phone]

## 2022-09-16 NOTE — TELEPHONE ENCOUNTER
Relayed message from [facility]. Patient verbalized and confirmed understanding.

## 2022-10-05 ENCOUNTER — TELEPHONE (OUTPATIENT)
Dept: CARDIOLOGY CLINIC | Age: 87
End: 2022-10-05

## 2022-10-05 NOTE — TELEPHONE ENCOUNTER
Called patient she had 2 shots today [id number]  (Evenity) She said that she is supposed to an injection every month for a year. Then switch to annual injections.

## 2022-10-05 NOTE — TELEPHONE ENCOUNTER
Nixon Irby called in today she said that pcp Dr. Carolina Michelle has recommended her to take injections for osteoporosis  but, she is concerned that one of the side affects is Heart attack possible [place] she would like Dr. Shania Perez opinion on this     Nixon Irby can be reached at  [phone]

## 2022-10-06 NOTE — TELEPHONE ENCOUNTER
Reviewed Up To Date and this does carry an increased risk of heart attack. With her history of coronary disease, she should discuss with her PCP finding an alternative medication.

## 2022-10-06 NOTE — TELEPHONE ENCOUNTER
Patient was notified regarding Dixie RN message ,pt will f/u with her PCP to finding an alternative medication next week is her appointment with her PCP.

## 2022-12-08 ENCOUNTER — OFFICE VISIT (OUTPATIENT)
Dept: CARDIOLOGY CLINIC | Age: 87
End: 2022-12-08
Payer: MEDICARE

## 2022-12-08 VITALS
HEART RATE: 60 BPM | HEIGHT: 62 IN | SYSTOLIC BLOOD PRESSURE: 122 MMHG | DIASTOLIC BLOOD PRESSURE: 64 MMHG | BODY MASS INDEX: 19.88 KG/M2 | OXYGEN SATURATION: 99 % | WEIGHT: 108 LBS

## 2022-12-08 DIAGNOSIS — I48.0 PAF (PAROXYSMAL ATRIAL FIBRILLATION) (HCC): ICD-10-CM

## 2022-12-08 DIAGNOSIS — Z95.1 S/P CABG (CORONARY ARTERY BYPASS GRAFT): ICD-10-CM

## 2022-12-08 DIAGNOSIS — I25.10 CORONARY ARTERY DISEASE INVOLVING NATIVE CORONARY ARTERY OF NATIVE HEART WITHOUT ANGINA PECTORIS: Primary | ICD-10-CM

## 2022-12-08 DIAGNOSIS — I10 ESSENTIAL HYPERTENSION: ICD-10-CM

## 2022-12-08 DIAGNOSIS — E78.5 HYPERLIPIDEMIA LDL GOAL <70: ICD-10-CM

## 2022-12-08 DIAGNOSIS — I71.21 ANEURYSM OF ASCENDING AORTA WITHOUT RUPTURE: ICD-10-CM

## 2022-12-08 PROCEDURE — G8420 CALC BMI NORM PARAMETERS: HCPCS | Performed by: INTERNAL MEDICINE

## 2022-12-08 PROCEDURE — 3074F SYST BP LT 130 MM HG: CPT | Performed by: INTERNAL MEDICINE

## 2022-12-08 PROCEDURE — 1090F PRES/ABSN URINE INCON ASSESS: CPT | Performed by: INTERNAL MEDICINE

## 2022-12-08 PROCEDURE — 3078F DIAST BP <80 MM HG: CPT | Performed by: INTERNAL MEDICINE

## 2022-12-08 PROCEDURE — 1123F ACP DISCUSS/DSCN MKR DOCD: CPT | Performed by: INTERNAL MEDICINE

## 2022-12-08 PROCEDURE — G8400 PT W/DXA NO RESULTS DOC: HCPCS | Performed by: INTERNAL MEDICINE

## 2022-12-08 PROCEDURE — G8427 DOCREV CUR MEDS BY ELIG CLIN: HCPCS | Performed by: INTERNAL MEDICINE

## 2022-12-08 PROCEDURE — 1036F TOBACCO NON-USER: CPT | Performed by: INTERNAL MEDICINE

## 2022-12-08 PROCEDURE — G8484 FLU IMMUNIZE NO ADMIN: HCPCS | Performed by: INTERNAL MEDICINE

## 2022-12-08 PROCEDURE — 99214 OFFICE O/P EST MOD 30 MIN: CPT | Performed by: INTERNAL MEDICINE

## 2022-12-08 RX ORDER — TRAMADOL HYDROCHLORIDE 50 MG/1
50 TABLET ORAL EVERY 6 HOURS PRN
COMMUNITY
Start: 2022-10-14

## 2022-12-08 NOTE — PROGRESS NOTES
[address]      Cardiology Consult    [facility]  1935 December 8, 2022    Primary care physician: Olya Roque NP   Reason for Referral: CAD     CC: \"I'm so tired. \"     HPI:  The patient is 81 y.o. female with a past medical history significant for coronary artery disease s/p CABG, hypertension, atrial fibrillation, ascending aortic aneurysm, and hyperlipidemia who presents today for management of CAD and Afib. She previously followed with Dr. Josefina Stuart after she presented to the ED with chest pain. She was admitted with an NSTEMI and underwent angiography that resulted in PCI prox LAD and left main x2 CARROL. She was admitted to [facility] 11/2020 and was found to be in atrial fibrillation with RVR. She spontaneously converted and was started on Eliquis 2.5 mg BID. Today, she denies any new cardiac sounding complaints but reports chronic fatigue and constipation. She denies any worsening shortness of breath, palpitations, or chest pains. She denies any abnormal bleeding but states that she bruises easily. She endorses depression particularly since the death of her . She repots compliance with her medications and tolerating. Patient denies exertional chest pain/pressure, dyspnea at rest, worsening COLIN, PND, orthopnea, palpitations, lightheadedness, weight changes, changes in LE edema, and syncope. Past Medical History:   Diagnosis Date    CAD (coronary artery disease)     --S/p CABG X3 2000 at . S/P NSTEMI 10/16/20. Echo 10/2020 LVEF normal, mild AI, MR.  Cath-->    GERD (gastroesophageal reflux disease)     Hyperlipidemia     Hypertension     PAF (paroxysmal atrial fibrillation) ([facility])     S/P AAA repair     Thoracic aortic aneurysm      Past Surgical History:   Procedure Laterality Date    CHOLECYSTECTOMY      CORONARY ARTERY BYPASS GRAFT  12/2000    HYSTERECTOMY (CERVIX STATUS UNKNOWN)      THORACIC AORTIC ANEURYSM REPAIR       Family History   Problem Relation Age of Onset Heart Disease Mother     High Blood Pressure Mother     Stroke Mother     Heart Disease Father      Social History     Tobacco Use    Smoking status: Former     Packs/day: 0.50     Years: 20.00     Pack years: 10.00     Types: Cigarettes     Quit date: 1996     Years since quittin.6    Smokeless tobacco: Never   Vaping Use    Vaping Use: Never used   Substance Use Topics    Alcohol use: Yes     Comment: occasionally    Drug use: Never     Allergies   Allergen Reactions    Amoxil [Amoxicillin] Rash     Current Outpatient Medications   Medication Sig Dispense Refill    traMADol (ULTRAM) 50 MG tablet Take 50 mg by mouth every 6 hours as needed. metoprolol succinate (TOPROL XL) 50 MG extended release tablet 1/2 tablet (25 mg) on Monday, Wednesday and Friday. 90 tablet 3    lisinopril (PRINIVIL;ZESTRIL) 20 MG tablet Take 1 tablet by mouth 2 times daily 60 tablet 5    amLODIPine (NORVASC) 5 MG tablet Take 1 tablet by mouth 2 times daily 60 tablet 5    atorvastatin (LIPITOR) 20 MG tablet Take 1 tablet by mouth daily 30 tablet 5    apixaban (ELIQUIS) 2.5 MG TABS tablet Take 2.5 mg by mouth 2 times daily      clopidogrel (PLAVIX) 75 MG tablet Take 1 tablet by mouth daily 30 tablet 3    LORazepam (ATIVAN) 1 MG tablet Take 1 mg by mouth 2 times daily. omeprazole (PRILOSEC) 20 MG capsule Take 40 mg by mouth daily. Calcium Carbonate (CALTRATE 600 PO) Take 600 mg by mouth 2 times daily       Multiple Vitamins-Minerals (THERAPEUTIC MULTIVITAMIN-MINERALS) tablet Take 1 tablet by mouth daily. levobunolol (BETAGAN) 0.5 % ophthalmic solution Place 1 drop into the left eye 2 times daily        No current facility-administered medications for this visit. Review of Systems:  Constitutional: No unanticipated weight loss. There's been no change in energy level, sleep pattern, or activity level. No fevers, chills. Eyes: No visual changes or diplopia. No scleral icterus.   ENT: No Headaches, hearing loss or vertigo. No mouth sores or sore throat. Cardiovascular: as reviewed in HPI  Respiratory: No cough or wheezing, no sputum production. No hemoptysis. Gastrointestinal: No abdominal pain, appetite loss, blood in stools. No change in bowel or bladder habits. Genitourinary: No dysuria, trouble voiding, or hematuria. Musculoskeletal:  No gait disturbance, no joint complaints. Integumentary: No rash or pruritis. Neurological: No headache, diplopia, change in muscle strength, numbness or tingling. Psychiatric: No anxiety or depression. Endocrine: No temperature intolerance. No excessive thirst, fluid intake, or urination. No tremor. Hematologic/Lymphatic: No abnormal bruising or bleeding, blood clots or swollen lymph nodes. Allergic/Immunologic: No nasal congestion or hives. Physical Exam:   /64   Pulse 60   Ht 5' 2\" (1.575 m)   Wt 108 lb (49 kg)   LMP  (LMP Unknown)   SpO2 99%   BMI 19.75 kg/m²   Wt Readings from Last 3 Encounters:   12/08/22 108 lb (49 kg)   08/24/22 111 lb (50.3 kg)   06/16/22 105 lb (47.6 kg)     Constitutional: She is oriented to person, place, and time. Elderly, thin, frail. In no acute distress. Head: Normocephalic and atraumatic. Pupils equal and round. Neck: Neck supple. No JVP or carotid bruit appreciated. No mass and no thyromegaly present. No lymphadenopathy present. Cardiovascular: Normal rate. Normal heart sounds. Exam reveals no gallop and no friction rub. No murmur heard. Pulmonary/Chest: Effort normal and breath sounds normal. No respiratory distress. She has no wheezes, rhonchi or rales. Abdominal: Soft, non-tender. Bowel sounds are normal. She exhibits no organomegaly, mass or bruit. Extremities: No edema. No cyanosis or clubbing. Pulses are 2+ radial/carotid bilaterally. Neurological: No gross cranial nerve deficit. Coordination normal.   Skin: Skin is warm and dry. There is no rash or diaphoresis. Psychiatric: She has a depressed mood and affect. Her speech is normal and behavior is normal.     Lab Review: FLP 7/2021 TG 83, HDL 52, LDL 56  FLP 11/2021 , HDL 50, LDL 77    FLP:  No results found for: TRIG, HDL, LDLCALC, LDLDIRECT, LABVLDL  BUN/Creatinine:    Lab Results   Component Value Date/Time    BUN 25 10/22/2020 04:27 AM    CREATININE 0.8 10/22/2020 04:27 AM   8/2021 BUN 24, Cr 1.19. H/H normal.   11/2021 BUN 16, Cr 1.34. K 4.2. H/H 13.4, 40.3    EKG Interpretation: 11/17/20 Sinus rhythm. Cannot rule out anterior MI.   6/16/22 Sinus bradycardia. Left anterior fascicular block. Minimal voltage criteria for LVH. Possible Anteroseptal infarct. Image Review:   Cath 10/16/20   The left main coronary artery has a 90% stenosis. 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 PercAx Impella CP placement and successful explant  S/p successful atherectomy and IVUS guided PCI prox LAD and left main X 2 CARROL    Echo 10/17/20   The aortic root & ascending aorta are mildly dilated (4 cm). Normal LV size,wall thickness and motion. Estimated ejection fraction is 70%. Trivial mitral regurgitation is present. The left atrium is normal in size. Mild aortic regurgitation. Normal right ventricular size and function. Trivial tricuspid regurgitation. CTA 10/19/20   Dilation of the ascending aorta up to 4.1 cm. Focal aneurysm of the   descending aorta just above the diaphragm measuring 4.6 cm. Carotid 11/8/22  A mild lesion noted in the right internal carotid artery estimated at   1-39% stenosis. A mild lesion noted in the left internal carotid artery estimated at 1-39% stenosis. The bilateral vertebral arteries are patent with antegrade flow. CT abdomen 11/8/22   Stable ascending and descending thoracic aortic aneurysms. Stable ectasia/aneurysm of abdominal aorta. Stable severe celiac and SMA stenoses.    Hyperdense renal lesions mildly increased in size, most likely hyperdense   renal cysts. Recommend follow-up with renal ultrasound. Hazy nodular opacity left lung upper lobe is likely infectious/inflammatory   change. Recommend 3 month follow-up chest CT. Assessment/Plan:   1) CAD/CABG s/p PCI LAD and left main x2 CARROL. Asymptomatic. Continue with medical management and risk factor modification including Plavix, statin, and B-blocker. Discontinued aspirin while on chronic anticoagulation. Discussed switching the Plavix to ASA with recurrent bruising and she will call if she would prefer to switch. 2) Paroxysmal atrial fibrillation. Asymptomatic. [id number] Score of at least 5. Treatment options for atrial fibrillation discussed including rate control, anticoagulation options, antiarrhythmics, and cardioversion. Continue Eliquis 2.5 mg BID (age, weight). Discontinued aspirin while on chronic anticoagulation and Plavix. Continue rate control strategy currently with beta-blocker. 3) Ascending aortic aneurysm. Asymptomatic. Measured 4.1 cm on CT 10/2020 and repeat scan 11/2022 remains stable. Continue statin and attempts at BP control but unlikely to repeat imaging secondary to her advanced age. 4) Essential hypertension. Controlled. Goal BP <130/80. Continue medical therapy. 5) Hyperlipidemia. Continue Lipitor 20 mg daily. LDL 77 (10/2022). 6) CKD stage IIIa/renal cyst. Following with nephrology, creatinine remains stable. 7) Depression/fatigue/constipation. Encouraged continued follow up with PCP. Follow up in 6 months    Thank you very much for allowing me to participate in the care of your patient. Please do not hesitate to contact me if you have any questions. Sincerely,  Debra Car. Aicha Menard, [facility] [place] [address]  Ph: [phone]  Fax: [phone]    This note was scribed in the presence of Dr Aicha Menard MD by Cathy Garcia RN.    Physician Attestation: The scribes documentation has been prepared under my direction and personally reviewed by me in its entirety. I confirm that the note above accurately reflects all work, treatment, procedures, and medical decision making performed by me. All portions of the note including but not limited to the chief complaint, history of present illness, physical exam, assessment and plan/medical decision making were personally reviewed, edited, and updated on the day of the visit.

## 2022-12-27 ENCOUNTER — TELEPHONE (OUTPATIENT)
Dept: CARDIOLOGY CLINIC | Age: 87
End: 2022-12-27

## 2022-12-27 RX ORDER — METOPROLOL SUCCINATE 50 MG/1
TABLET, EXTENDED RELEASE ORAL
Qty: 90 TABLET | Refills: 3
Start: 2022-12-27

## 2022-12-27 NOTE — TELEPHONE ENCOUNTER
Patient called and stated that he heart rate was elevated on Sunday at 151. She was asking if she could go back to metoprolol 50 mg M,W,F. I let her know that it would be fine to do that. She will call if her heart rate remains elevated.

## 2022-12-28 NOTE — TELEPHONE ENCOUNTER
Returned call to [facility] and she is very concerned about her heart rate being elevated this week. Advised her to take the metoprolol 25 mg every evening and if it continues to be elevated can get her in to see Dr Yonatan Dunbar. She would like to go ahead and make the appointment and if heart rate improves she will cancel.

## 2022-12-28 NOTE — TELEPHONE ENCOUNTER
Bowen called increase in HR and BP again. This morning she recorded her /86, . Took it again 45 min later and her HR was 153.     Please advise: [phone]

## 2023-01-05 ENCOUNTER — OFFICE VISIT (OUTPATIENT)
Dept: CARDIOLOGY CLINIC | Age: 88
End: 2023-01-05
Payer: MEDICARE

## 2023-01-05 VITALS
BODY MASS INDEX: 19.32 KG/M2 | OXYGEN SATURATION: 98 % | SYSTOLIC BLOOD PRESSURE: 136 MMHG | DIASTOLIC BLOOD PRESSURE: 60 MMHG | HEART RATE: 58 BPM | WEIGHT: 105 LBS | HEIGHT: 62 IN

## 2023-01-05 DIAGNOSIS — I71.21 ANEURYSM OF ASCENDING AORTA WITHOUT RUPTURE: ICD-10-CM

## 2023-01-05 DIAGNOSIS — E78.5 HYPERLIPIDEMIA LDL GOAL <70: ICD-10-CM

## 2023-01-05 DIAGNOSIS — I25.10 CORONARY ARTERY DISEASE INVOLVING NATIVE CORONARY ARTERY OF NATIVE HEART WITHOUT ANGINA PECTORIS: Primary | ICD-10-CM

## 2023-01-05 DIAGNOSIS — I10 ESSENTIAL HYPERTENSION: ICD-10-CM

## 2023-01-05 DIAGNOSIS — N18.31 STAGE 3A CHRONIC KIDNEY DISEASE (HCC): ICD-10-CM

## 2023-01-05 DIAGNOSIS — Z95.1 S/P CABG (CORONARY ARTERY BYPASS GRAFT): ICD-10-CM

## 2023-01-05 DIAGNOSIS — I48.0 PAF (PAROXYSMAL ATRIAL FIBRILLATION) (HCC): ICD-10-CM

## 2023-01-05 PROCEDURE — 99214 OFFICE O/P EST MOD 30 MIN: CPT | Performed by: INTERNAL MEDICINE

## 2023-01-05 PROCEDURE — G8420 CALC BMI NORM PARAMETERS: HCPCS | Performed by: INTERNAL MEDICINE

## 2023-01-05 PROCEDURE — 1036F TOBACCO NON-USER: CPT | Performed by: INTERNAL MEDICINE

## 2023-01-05 PROCEDURE — 1090F PRES/ABSN URINE INCON ASSESS: CPT | Performed by: INTERNAL MEDICINE

## 2023-01-05 PROCEDURE — G8484 FLU IMMUNIZE NO ADMIN: HCPCS | Performed by: INTERNAL MEDICINE

## 2023-01-05 PROCEDURE — G8427 DOCREV CUR MEDS BY ELIG CLIN: HCPCS | Performed by: INTERNAL MEDICINE

## 2023-01-05 PROCEDURE — 1123F ACP DISCUSS/DSCN MKR DOCD: CPT | Performed by: INTERNAL MEDICINE

## 2023-01-05 RX ORDER — METOPROLOL SUCCINATE 50 MG/1
TABLET, EXTENDED RELEASE ORAL
Qty: 45 TABLET | Refills: 3 | Status: SHIPPED | OUTPATIENT
Start: 2023-01-05

## 2023-01-05 RX ORDER — AMIODARONE HYDROCHLORIDE 200 MG/1
200 TABLET ORAL DAILY
Qty: 30 TABLET | Refills: 11 | Status: SHIPPED | OUTPATIENT
Start: 2023-01-05

## 2023-01-05 NOTE — PROGRESS NOTES
[facility]      Cardiology Progress Note    Emily Watters  1935 January 5, 2023    Primary care physician: Mode Carrillo NP   Reason for Referral: CAD     CC: \"My heart rate has been elevated and I don't feel right. \"     HPI:  The patient is 81 y.o. female with a past medical history significant for coronary artery disease s/p CABG, hypertension, atrial fibrillation, ascending aortic aneurysm, and hyperlipidemia who presents today for management of CAD and Afib. She previously followed with Dr. Fareed Villaseñor after she presented to the ED with chest pain. She was admitted with an NSTEMI and underwent angiography that resulted in PCI prox LAD and left main x2 CARROL. She was admitted to [facility] 11/2020 and was found to be in atrial fibrillation with RVR. She spontaneously converted and was started on Eliquis 2.5 mg BID. Today, she made an acute visit for periods of an elevated heart rate. She denies palpitations per se but will suddenly feel \"not normal\" and experience some vague sensation in her neck and face. She will then check her vitals and reports elevated heart rate, ~130-150's bpm with a normal or slightly elevated BP. She denies associated chest pains or lightheadedness. She reports a prior intolerance to amiodarone but is swilling to try again. She reports medical therapy compliance and tolerating. Her other major complaint is anxiety and depression. Past Medical History:   Diagnosis Date    CAD (coronary artery disease)     --S/p CABG X3 2000 at . S/P NSTEMI 10/16/20. Echo 10/2020 LVEF normal, mild AI, MR.  Cath-->    GERD (gastroesophageal reflux disease)     Hyperlipidemia     Hypertension     PAF (paroxysmal atrial fibrillation) ([facility])     S/P AAA repair     Thoracic aortic aneurysm      Past Surgical History:   Procedure Laterality Date    CHOLECYSTECTOMY      CORONARY ARTERY BYPASS GRAFT  12/2000    HYSTERECTOMY (CERVIX STATUS UNKNOWN)      THORACIC AORTIC ANEURYSM REPAIR Family History   Problem Relation Age of Onset    Heart Disease Mother     High Blood Pressure Mother     Stroke Mother     Heart Disease Father      Social History     Tobacco Use    Smoking status: Former     Packs/day: 0.50     Years: 20.00     Pack years: 10.00     Types: Cigarettes     Quit date: 1996     Years since quittin.6    Smokeless tobacco: Never   Vaping Use    Vaping Use: Never used   Substance Use Topics    Alcohol use: Yes     Comment: occasionally    Drug use: Never     Allergies   Allergen Reactions    Amoxil [Amoxicillin] Rash     Current Outpatient Medications   Medication Sig Dispense Refill    metoprolol succinate (TOPROL XL) 50 MG extended release tablet Take 1 t table on Monday, Wednesday and Friday. (Patient taking differently: 25 mg daily Take 1 t table on Monday, Wednesday and Friday.) 90 tablet 3    apixaban (ELIQUIS) 2.5 MG TABS tablet Take 1 tablet by mouth 2 times daily 60 tablet 11    lisinopril (PRINIVIL;ZESTRIL) 20 MG tablet Take 1 tablet by mouth 2 times daily 60 tablet 5    amLODIPine (NORVASC) 5 MG tablet Take 1 tablet by mouth 2 times daily 60 tablet 5    atorvastatin (LIPITOR) 20 MG tablet Take 1 tablet by mouth daily 30 tablet 5    clopidogrel (PLAVIX) 75 MG tablet Take 1 tablet by mouth daily 30 tablet 3    LORazepam (ATIVAN) 1 MG tablet Take 1 mg by mouth every 6 hours as needed. omeprazole (PRILOSEC) 20 MG capsule Take 40 mg by mouth daily. Calcium Carbonate (CALTRATE 600 PO) Take 600 mg by mouth 2 times daily       Multiple Vitamins-Minerals (THERAPEUTIC MULTIVITAMIN-MINERALS) tablet Take 1 tablet by mouth daily. levobunolol (BETAGAN) 0.5 % ophthalmic solution Place 1 drop into the left eye 2 times daily        No current facility-administered medications for this visit. Review of Systems:  Constitutional: No unanticipated weight loss. There's been no change in energy level, sleep pattern, or activity level. No fevers, chills.    Eyes: No visual changes or diplopia. No scleral icterus. ENT: No Headaches, hearing loss or vertigo. No mouth sores or sore throat. Cardiovascular: as reviewed in HPI  Respiratory: No cough or wheezing, no sputum production. No hemoptysis. Gastrointestinal: No abdominal pain, appetite loss, blood in stools. No change in bowel or bladder habits. Genitourinary: No dysuria, trouble voiding, or hematuria. Musculoskeletal:  No gait disturbance, no joint complaints. Integumentary: No rash or pruritis. Neurological: No headache, diplopia, change in muscle strength, numbness or tingling. Psychiatric: No anxiety or depression. Endocrine: No temperature intolerance. No excessive thirst, fluid intake, or urination. No tremor. Hematologic/Lymphatic: No abnormal bruising or bleeding, blood clots or swollen lymph nodes. Allergic/Immunologic: No nasal congestion or hives. Physical Exam:   /60   Pulse 58   Ht 5' 2\" (1.575 m)   Wt 105 lb (47.6 kg)   LMP  (LMP Unknown)   SpO2 98%   BMI 19.20 kg/m²   Wt Readings from Last 3 Encounters:   01/05/23 105 lb (47.6 kg)   12/28/22 107 lb 9.6 oz (48.8 kg)   12/08/22 108 lb (49 kg)     Constitutional: She is oriented to person, place, and time. Elderly, thin, frail. In no acute distress. Head: Normocephalic and atraumatic. Pupils equal and round. Neck: Neck supple. No JVP or carotid bruit appreciated. No mass and no thyromegaly present. No lymphadenopathy present. Cardiovascular: Normal rate. Normal heart sounds. Exam reveals no gallop and no friction rub. No murmur heard. Pulmonary/Chest: Effort normal and breath sounds normal. No respiratory distress. She has no wheezes, rhonchi or rales. Abdominal: Soft, non-tender. Bowel sounds are normal. She exhibits no organomegaly, mass or bruit. Extremities: No edema. No cyanosis or clubbing. Pulses are 2+ radial/carotid bilaterally. Neurological: No gross cranial nerve deficit.  Coordination normal.   Skin: Skin is warm and dry. There is no rash or diaphoresis. Psychiatric: She has a depressed mood and affect. Her speech is normal and behavior is normal.     Lab Review:   [address] 12/20/2022-reviewed     EKG Interpretation:   11/17/20 Sinus rhythm. Cannot rule out anterior MI.   6/16/22 Sinus bradycardia. Left anterior fascicular block. Minimal voltage criteria for LVH. Possible Anteroseptal infarct. Image Review:   Cath 10/16/20   The left main coronary artery has a 90% stenosis. 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 PercAx Impella CP placement and successful explant  S/p successful atherectomy and IVUS guided PCI prox LAD and left main X 2 CARROL    Echo 10/17/20   The aortic root & ascending aorta are mildly dilated (4 cm). Normal LV size,wall thickness and motion. Estimated ejection fraction is 70%. Trivial mitral regurgitation is present. The left atrium is normal in size. Mild aortic regurgitation. Normal right ventricular size and function. Trivial tricuspid regurgitation. CTA 10/19/20   Dilation of the ascending aorta up to 4.1 cm. Focal aneurysm of the   descending aorta just above the diaphragm measuring 4.6 cm. Carotid 11/8/22  A mild lesion noted in the right internal carotid artery estimated at   1-39% stenosis. A mild lesion noted in the left internal carotid artery estimated at 1-39% stenosis. The bilateral vertebral arteries are patent with antegrade flow. CT abdomen 11/8/22   Stable ascending and descending thoracic aortic aneurysms. Stable ectasia/aneurysm of abdominal aorta. Stable severe celiac and SMA stenoses. Hyperdense renal lesions mildly increased in size, most likely hyperdense   renal cysts. Recommend follow-up with renal ultrasound. Hazy nodular opacity left lung upper lobe is likely infectious/inflammatory   change.  Recommend 3 month follow-up chest CT. Assessment/Plan:   1) CAD/CABG s/p PCI LAD and left main x2 CARROL. Asymptomatic. Continue with medical management and risk factor modification including Plavix, statin, and B-blocker. Discontinued aspirin while on chronic anticoagulation. Discussed switching the Plavix to ASA with recurrent bruising and she will call if she would prefer to switch. 2) Paroxysmal atrial fibrillation. Symptomatic. [id number] Score of at least 5. Treatment options for atrial fibrillation again discussed including rate control, anticoagulation options, antiarrhythmics, and cardioversion. Continue Eliquis 2.5 mg BID (age, weight). CBC stable 12/20/22. She will remain chronic anticoagulation and Plavix. Will attempt rhythm control and start amiodarone 200 mg daily (prior tremors reported 12/2020 but has tremors at baseline.)     3) Ascending aortic aneurysm. Asymptomatic. Measured 4.1 cm on CT 10/2020 and repeat scan 11/2022 remains stable. Continue statin and attempts at BP control but unlikely to repeat imaging secondary to her advanced age. 4) Essential hypertension. Controlled. Goal BP <130/80. Continue medical therapy. BMP stable 12/2022.     5) Hyperlipidemia. Continue Lipitor 20 mg daily. LDL 77 (10/2022). 6) CKD stage IIIa/renal cyst. Following with nephrology, creatinine remains stable. 7) Depression/fatigue/constipation. Encouraged continued follow up with PCP. Follow up in 6 months    Thank you very much for allowing me to participate in the care of your patient. Please do not hesitate to contact me if you have any questions. Sincerely,  Felisa Perry. Aniket Adams, [facility] [place] [address]  Ph: [phone]  Fax: [phone]    This note was scribed in the presence of Dr. Cynthia Gastelum MD by Ravin Ordoñez RN.   Physician Attestation: The scribes documentation has been prepared under my direction and personally reviewed by me in its entirety. I confirm that the note above accurately reflects all work, treatment, procedures, and medical decision making performed by me. All portions of the note including but not limited to the chief complaint, history of present illness, physical exam, assessment and plan/medical decision making were personally reviewed, edited, and updated on the day of the visit.

## 2023-01-09 NOTE — TELEPHONE ENCOUNTER
Keerthi Vargas called in again stating that she has only 1 pill left and she wants to make that  calls the prescription in to the Pharmacy.

## 2023-01-09 NOTE — TELEPHONE ENCOUNTER
Nixon Irby called in this afternoon stating that she is approved to get her Eliquis free through [facility] till Dec. 2023. She needs a script sent to them.       Medication Refill    Medication needing refilled:  apixaban (ELIQUIS)    Dosage of the medication:  2.5 MG TABS tablet     How are you taking this medication (QD, BID, TID, QID, PRN):  Take 1 tablet by mouth 2 times daily    30 or 90 day supply called in:    When will you run out of your medication:    Which Pharmacy are we sending the medication to?:    [address]

## 2023-01-10 NOTE — TELEPHONE ENCOUNTER
Bowen called in today she is concerned about her refill she would like a call back once it is sent in thanks     Bowen [phone]

## 2023-01-25 ENCOUNTER — TELEPHONE (OUTPATIENT)
Dept: CARDIOLOGY CLINIC | Age: 88
End: 2023-01-25

## 2023-01-25 NOTE — TELEPHONE ENCOUNTER
[facility] states they tremors have gotten worse since starting the amiodarone 200 mg daily. She states her heart rate will average 55-65 and denies any new cardiac sounding complaints. Instructed she may reduce the amiodarone to 100 mg daily and see if she notices any improvement and to return call in a few weeks if not. She v/u.

## 2023-01-25 NOTE — TELEPHONE ENCOUNTER
[facility]  HOSPITAL MEDICINE PROGRESS NOTE    Patient: Jakub Candelario Today's Date: 2/10/2022   YOB: 1974 Admission Date: 12/11/2021 11:17 PM   MRN: [id number] Inpatient LOS: 61 day(s)   Room:  [facility]/A Hospital Day:  Hospital Day: 62       History and Subjective complaints     Chief Complaint: Cardiogenic shock, Wound infection     Interval history and Overnight events:    Breathing is better  Had frequent but solid BMs yesterday. Thinks may be related to diary/diet  He has been been drinking more water    Hospital Course  Patients interval history reviewed/EHR notes reviewed.     47 years old male with past medical history of alcohol use disorder, alcohol related cardiomyopathy, alcoholic cirrhosis who presented to [facility] on 12/3/2022 after he was found down.  Patient was septic on presentation.  He was started on IV antibiotics and admitted to the ICU for further management. His ICU course was notable for worsening kidney failure requiring hemodialysis, severe alcohol withdrawal, refractory shock.  Patient was transferred to [facility] on 12/11/2021 for higher level of care. He needed dialysis intermittently, his dialysis catheter was removed on 12/20/2021.  Patient was transferred from the ICU 12/21/2021    Patient developed nonsustained V-tach, EP was consulted.  He was initially treated with amiodarone and then transitioned to mexiletine and metoprolol.  2D echo showed ejection fraction of 37%, heart failure team was consulted, patient was not a candidate for advanced heart failure therapy.  Patient was treated with dobutamine for management of cardiogenic shock.    Hospital course was notable for severe volume overload, massive anasarca, moderate to severe ascites, bilateral pleural effusion. He needed paracentesis on 1/14/2022, 1/20/2022 and 1/26/2022.  He also needed thoracentesis on 1/7/2022 and 1/29/2022.    Patient was treated with Lasix drip.  He responded  Willam Carias called into the office wanting to speak with someone in regards to the amiodarone that she was prescribed. She states that she was recently off of it as it caused her nervous system to react. Willam Carias mentioned that her left hand tremors and it makes it difficult to do certain daily functions. When asked if Willam Carias noticed any other symptoms while on the medication, she stated that she notices her pulse rate to be in the lower 50's, usually in the mid 50-60 range.       Willam Carias wants to be advised if she should continue the medication or stop immediately, please call: [phone] well to it and then he was transition to oral diuretics per Nephrology recommendations.    Patient also developed hepatorenal syndrome. He was treated with albumin, octreotide, midodrine per Nephrology recommendations.    Because of persistent diarrhea, decompensated cirrhosis he underwent an EGD and a colonoscopy on 1/28/2022 and tolerated procedure well.  EGD showed chronic gastritis, colonoscopy showed diffuse edema.  Hepatology was consulted on 1/31/2022    Patient also developed left hip wound infection that was treated with Bactrim per ID recommendations.    Hospital course was prolonged because of severe diarrhea and failure to thrive.  Patient initially needed an NG tube with tube feeding and a rectal tube. Rectal tube was removed on 1/25/2022.  His diet was slowly advanced.    Palliative care was consulted and discussed condition in detail with patient and his family, he remains selective DNR but wants to continue with maximal medical management.    Reviewed Pertinent Histories: Medical History, Surgical History, Social History, Family History,     ROS: 12-point review of systems negative except as above.    Medications: Reviewed     Scheduled Medications:    potassium CHLORIDE, 40 mEq, Once  metOLazone, 5 mg, Daily  ALPRAZolam, 0.5 mg, Once  magnesium oxide, 800 mg, BID  loperamide, 2 mg, Every Other Day  spironolactone, 12.5 mg, Daily  gabapentin, 300 mg, TID  heparin (porcine), 5,000 Units, 3 times per day  ferrous sulfate, 325 mg, Every Other Day  potassium CHLORIDE, 40 mEq, TID  thiamine, 100 mg, Daily  pantoprazole, 40 mg, 2 times per day  zinc sulfate, 220 mg, Daily with breakfast  ascorbic acid, 500 mg, Daily  B complex-vitamin C-folic acid, 1 tablet, Daily  cholecalciferol, 25 mcg, Daily  lactobacillus acidophilus, 2 tablet, BID  melatonin, 9 mg, Nightly  rifAXIMin, 550 mg, 2 times per day  mexiletine, 150 mg, 3 times per day  sertraline, 100 mg, Daily  Magnesium Standard Replacement Protocol, ,  See Admin Instructions  sodium chloride (PF), 2 mL, 2 times per day  Potassium Standard Replacement Protocol, , See Admin Instructions      Continuous Infusions:    • furosemide (LASIX INJECT) 250 mg/125 mL in sodium chloride 0.9 % infusion 10 mg/hr (02/09/22 1455)   • sodium chloride 0.9% infusion     • sodium chloride 0.9% infusion Stopped (01/25/22 0817)     PRN Medications:  HYDROcodone-acetaminophen, ALPRAZolam, diphenhydrAMINE, lactulose, dextrose, dextrose, glucagon, dextrose, dextrose, hydrOXYzine, calcium carbonate, acetaminophen, sodium chloride, sodium chloride, sodium chloride, sodium chloride, sodium chloride, ondansetron **OR** ondansetron, potassium CHLORIDE      Physical Examination       Vital 24 Hour Range Most Recent Value   Temperature Temp  Min: 97.6 °F (36.4 °C)  Max: 97.8 °F (36.6 °C) 97.8 °F (36.6 °C)   Pulse Pulse  Min: 98  Max: 104 (!) 104   Respiratory Resp  Min: 16  Max: 18 18   Blood Pressure BP  Min: 105/79  Max: 126/64 105/79   Pulse Oximetry SpO2  Min: 95 %  Max: 98 % 98 %   Arterial BP No data recorded     O2 No data recorded       Intake and Output:      Intake/Output Summary (Last 24 hours) at 2/10/2022 0749  Last data filed at 2/10/2022 0600  Gross per 24 hour   Intake 2361.11 ml   Output 3800 ml   Net -1438.89 ml       Last Stool Occurrence:  1 (02/10/22 0020)    Vital Most Recent Value First Value   Weight 73.6 kg (162 lb 4.8 oz) Weight: 110.1 kg (242 lb 11.6 oz)   Height 5' 9\" (175.3 cm) Height: 5' 9\" (175.3 cm)   BMI 23.97 N/A     GENERAL:  Alert.  No respiratory distress, chronically ill appearance  ENT:  Sclerae is anicteric.  Oral mucosa is moist.  Pupils are equally round and reactive to light.  Extraocular movements are intact.  NECK:  No palpable nodes or mass.  No bruits heard.   CARDIAC:  S1, S2.  No murmur  LUNGS:  Decreased in the right lower lung  ABDOMEN:  Mod abdominal distension, soft, non-tender   SKIN:  Bilateral lower extremity stasis dermatitis, same erythema  cont w/o warmness (now with compression stocking)  MUSCULOSKELETAL:  +2 pitting lower extremity pitting edema. Upper extremities w/o swelling,deformity.  NEUROLOGIC:  The patient is alert, Ox3 The patient is appropriate. No involuntary movement.  Sensation WNL. CN grossly intact. Mm strength 3/5 in all four extremities. Noted L wrist drop 1/22    Test Results     Labs: The Laboratory values listed below have been reviewed and pertinent findings discussed in the Assessment and Plan.    Radiology: Imaging studies have been reviewed and pertinent findings discussed in the Assessment and Plan.    No results found for any visits on 12/11/21 (from the past 48 hour(s)).    Tubes, Devices, Monitoring     Telemetry: On      Bella: YES    Assessment and Plan     #Acute heart failure with reduced ejection fraction exacerbation  #Alcohol-related cardiomyopathy  #Volume overload  #hepatic hydrothorax leading to recurrent right-sided pleural effusion  - patient did not tolerate goal-directed medical therapy because of low blood pressure and acute kidney injury.  - no FR per pt preference. However, encouraged to limit fluids as he seems to be drinking more so he can urinate more   - repeat echo showed EF 18%  - AHFT on consult, not candidate for advanced HF options   - Restarted and remains on Lasix gtt  - Palliative care on consult, discussed condition with patient in detail.    #Hepatic encephalopathy  #decompensated Alcoholic cirrhosis  #Moderate ascites  #Persistent diarrhea  -continue rifaximin, folic acid.  - EGD showed significant erythema in the stomach.  - Colonoscopy showed diffuse colopathy suggestive of significant edema  - Continue PPI and Carafate.  - diuresis as above  - GI is following, recs are appreciated    #IESHA on CKD  #Hepatorenal syndrome  - in the setting of decompensated cirrhosis  - albumin was completed on 1/28/2022  - octreotide was completed on 1/28/2022.  - Midodrine is discontinued      #Recent  COVID-19 infection:  No issues     #Alcohol use disorder:  Encourage alcohol cessation.    #Nonsustained V-tach:  In the setting of alcohol-related cardiomyopathy, continue mexiletine per EP recs.  - LifeVest on discharge.  - accelerated junctional rhythm 2/8. Dced BB, continue mexiletine per EP    #Superficial left hip infection with stenotrophonomas:  Finished treatment with Bactrim    #Severe protein calorie malnutrition: dietitian on board. He feels he is eating better     #Anxiety and depression:  Continue Zoloft    #Multiple bedsores:  Will obtain wound care assessment    #Peripheral neuropathy:  Likely because of alcohol use disorder, continue gabapentin.    Consults:    IP CONSULT TO WOUND CARE MEDICAL PROVIDER  IP CONSULT TO NUTRITION SERVICES  IP CONSULT TO HEPATOLOGY  IP CONSULT TO NEPHROLOGY  IP CONSULT TO NUTRITION SERVICES  IP CONSULT TO CARDIOLOGY  PHARMACY TO DOSE AND MONITOR VANCOMYCIN  IP CONSULT TO ELECTROPHYSIOLOGY  IP CONSULT TO UROLOGY  IP CONSULT TO INFECTIOUS DISEASES  IP CONSULT TO NUTRITIONAL SERVICES - TUBE FEEDING  IP CONSULT TO PSYCHIATRY  IP CONSULT TO SOCIAL WORK  IP CONSULT TO NEUROLOGY  IP CONSULT TO WOUND CARE MEDICAL PROVIDER  IP CONSULT TO NUTRITION SERVICES  IP CONSULT TO SOCIAL WORK  IP CONSULT TO ELECTROPHYSIOLOGY  IP CONSULT TO PHARMACY  IP CONSULT TO CARDIOLOGY  IP CONSULT TO NUTRITION SERVICES  IP CONSULT TO GI  IP CONSULT TO CARDIOLOGY  IP CONSULT TO NUTRITION SERVICES  IP CONSULT TO PULMONOLOGY  IP CONSULT TO HEPATOLOGY  IP CONSULT TO PALLIATIVE CARE  IP CONSULT TO SOCIAL WORK  IP CONSULT TO HEART FAILURE CLINIC  IP CONSULT TO ELECTROPHYSIOLOGY    Diet:  Low sodium diet     Therapy Orders:    PT and OT Orders Placed this Encounter   Procedures   • Occupational Therapy   • Physical Therapy   • Physical Therapy              Advanced Directives     Code Status: Selective Treatment/DNR          Discharge Plan     The patients treatment plans were discussed with patient, RN and  consultant(s).     Recommendations for Discharge   SW Other (comment) (Location not determined at this time )   PT 24 Hour assist, Home, Home therapy (pt reports he will likely be staying with his cousin)   OT Post acute therapy   SLP None     Anticipated discharge destination:  Home with home care or subacute rehab.  Patient does not want to be transferred to [facility]      Expected Discharge Date: 2/13/2022     Barriers to Discharge:   Optimization of volume status, arrangement for outpatient paracentesis.  HF clearance. Arrangement for subacute rehab.         Dayami Diego MD

## 2023-01-30 ENCOUNTER — TELEPHONE (OUTPATIENT)
Dept: CARDIOLOGY CLINIC | Age: 88
End: 2023-01-30

## 2023-01-30 NOTE — TELEPHONE ENCOUNTER
David Thompson called in today she stated that she was put back on the amiodarone and even at the lower dose she is still having the shakes   She has stopped taking it she said that she is feeling better without it      David Thompson can be reached at [phone]

## 2023-01-30 NOTE — TELEPHONE ENCOUNTER
Called and spoke with [facility], instructed she may discontinue to see if symptoms improve. She states she is not interested in starting any additional medications. She monitors her blood pressure and heart rate remain controlled at home. Instructed to call with any worsening symptoms, Patient verbalized an understanding.

## 2023-10-06 NOTE — TELEPHONE ENCOUNTER
Dr. Char Fernandez,     Any new recommendations going into the holiday weekend? From what I can gather with multiple telephone encounters. The patient's BP readings have been elevated and Dr. Jose Bettencourt increased her Lisinopril to 40 mg daily on 11/19/20. Also amiodarone was reduce to due side effects. She is also on Toprol-XL. Patient to the office to check home BP monitor against home machine:     Manual 189/82 L, 194/90R, HR 70's   Home 200/82 L, 206/100 R, HR 70's. JOSE MARIA Rolon states she is visibly shaky and verbalized how nervous she was. She is on ativan PRN.
Please call patient with instructions. She needs to space our Lisinopril Toprol-XL and now norvasc for better BP control round the clock. Continue low salt diet daily and especially over holidays. If she needs help with her nervousness, contact treating provider. Please schedule her to come back in the [address] [place] [address] office for a nurse BP check in 2 weeks with Efren Aguirre, RN   Thanks.
Pt came in for a bp check, bp is still running high. Pt has made the comment a couple of times about \"I am so nervous I don't know what I am doing\"   I advised pt just relax, take her time and she said \" I will try\". Pt is also extemely shakey.  As she is sitting in the chair she is just shaking    Verified meds and they are correct per pt    Per pts machine:    L arm  200/82  P 75    R arm  206/100  P 71
Start Norvasc 5mg daily. Nurse visit in 2 weeks for BP check.
Eye

## 2023-11-03 ENCOUNTER — TELEPHONE (OUTPATIENT)
Dept: CARDIOLOGY CLINIC | Age: 88
End: 2023-11-03

## 2023-11-03 NOTE — TELEPHONE ENCOUNTER
PT returned completed patient assistance forms to [place]. Forms have been filled out (at [place]) and placed in Dr. Maksim Bowens folder for signatures.

## 2023-11-03 NOTE — TELEPHONE ENCOUNTER
Forms faxed to [facility]. Scanned completed set and fax confirmation to PT's chart.

## 2023-12-05 NOTE — PROGRESS NOTES
[facility]      Cardiology Progress Note    Pat Resendiz  1935 December 14, 2023    Primary care physician: Tanner Mcdaniel NP   Reason for Referral: CAD     CC: \"No problems\"    HPI:  The patient is 81 y.o. female with a past medical history significant for coronary artery disease s/p CABG, hypertension, atrial fibrillation, ascending aortic aneurysm, and hyperlipidemia who presents today for management of CAD and Afib. She previously followed with Dr. Felipe Guy after she presented to the ED with chest pain. She was admitted with an NSTEMI and underwent angiography that resulted in PCI prox LAD and left main x2 CARROL. She was admitted to [facility] 11/2020 and was found to be in atrial fibrillation with RVR. She spontaneously converted and was started on Eliquis 2.5 mg BID. Today, she has no new cardiovascular sounding complaints. She denies any chest pains or worsening shortness of breath. She reports compliance with her medications and tolerating. She will notice intermittent streaks of blood on the surface of her stool that she assumes is related to hemorrhoids. She acknowledges issues with constipation. She is taking a fiber supplement. Patient denies exertional chest pain/pressure, dyspnea at rest, COLIN, PND, orthopnea, palpitations, lightheadedness, weight changes, changes in LE edema, and syncope. Past Medical History:   Diagnosis Date    CAD (coronary artery disease)     --S/p CABG X3 2000 at . S/P NSTEMI 10/16/20. Echo 10/2020 LVEF normal, mild AI, MR.  Cath-->    GERD (gastroesophageal reflux disease)     Hyperlipidemia     Hypertension     PAF (paroxysmal atrial fibrillation) ([facility])     S/P AAA repair     Thoracic aortic aneurysm [facility])      Past Surgical History:   Procedure Laterality Date    CHOLECYSTECTOMY      CORONARY ARTERY BYPASS GRAFT  12/2000    HYSTERECTOMY (CERVIX STATUS UNKNOWN)      THORACIC AORTIC ANEURYSM REPAIR       Family History   Problem Relation Age of Onset

## 2023-12-14 ENCOUNTER — OFFICE VISIT (OUTPATIENT)
Dept: CARDIOLOGY CLINIC | Age: 88
End: 2023-12-14
Payer: MEDICARE

## 2023-12-14 VITALS
HEART RATE: 53 BPM | BODY MASS INDEX: 19.51 KG/M2 | DIASTOLIC BLOOD PRESSURE: 62 MMHG | OXYGEN SATURATION: 99 % | SYSTOLIC BLOOD PRESSURE: 120 MMHG | HEIGHT: 62 IN | WEIGHT: 106 LBS

## 2023-12-14 DIAGNOSIS — E78.5 HYPERLIPIDEMIA LDL GOAL <70: ICD-10-CM

## 2023-12-14 DIAGNOSIS — I25.10 CORONARY ARTERY DISEASE INVOLVING NATIVE CORONARY ARTERY OF NATIVE HEART WITHOUT ANGINA PECTORIS: Primary | ICD-10-CM

## 2023-12-14 DIAGNOSIS — I71.21 ANEURYSM OF ASCENDING AORTA WITHOUT RUPTURE (HCC): ICD-10-CM

## 2023-12-14 DIAGNOSIS — I48.0 PAF (PAROXYSMAL ATRIAL FIBRILLATION) (HCC): ICD-10-CM

## 2023-12-14 DIAGNOSIS — N18.32 STAGE 3B CHRONIC KIDNEY DISEASE (HCC): ICD-10-CM

## 2023-12-14 DIAGNOSIS — I10 ESSENTIAL HYPERTENSION: ICD-10-CM

## 2023-12-14 DIAGNOSIS — Z95.1 S/P CABG (CORONARY ARTERY BYPASS GRAFT): ICD-10-CM

## 2023-12-14 PROCEDURE — G8420 CALC BMI NORM PARAMETERS: HCPCS | Performed by: INTERNAL MEDICINE

## 2023-12-14 PROCEDURE — G8484 FLU IMMUNIZE NO ADMIN: HCPCS | Performed by: INTERNAL MEDICINE

## 2023-12-14 PROCEDURE — 99214 OFFICE O/P EST MOD 30 MIN: CPT | Performed by: INTERNAL MEDICINE

## 2023-12-14 PROCEDURE — 1036F TOBACCO NON-USER: CPT | Performed by: INTERNAL MEDICINE

## 2023-12-14 PROCEDURE — G8427 DOCREV CUR MEDS BY ELIG CLIN: HCPCS | Performed by: INTERNAL MEDICINE

## 2023-12-14 PROCEDURE — 1090F PRES/ABSN URINE INCON ASSESS: CPT | Performed by: INTERNAL MEDICINE

## 2023-12-14 PROCEDURE — 1123F ACP DISCUSS/DSCN MKR DOCD: CPT | Performed by: INTERNAL MEDICINE

## 2023-12-14 PROCEDURE — 93000 ELECTROCARDIOGRAM COMPLETE: CPT | Performed by: INTERNAL MEDICINE

## 2024-01-09 ENCOUNTER — TELEPHONE (OUTPATIENT)
Dept: CARDIOLOGY CLINIC | Age: 89
End: 2024-01-09

## 2024-01-09 NOTE — TELEPHONE ENCOUNTER
Dr. Huitron, please see below and further advise if necessary. Thanks.     Spoke with patient, she reports LLE edema that waxes and wanes, but reports it has worsened over the past few weeks. She notes improvement in the evening, but it does not completely resolve. She denies any RLE edema. She denies any pain, redness, discoloration, numbness/tingling of LLE. She denies any SOB or weight gain.  The left greater saphenous vein was previously removed for CABG from the proximal thigh to knee. Swelling may be from this. She does not wear compression stockings or elevate her legs often. She denies any recent injury to LLE that she is aware of. I encouraged compression stockings, leg elevation  and ambulation and advised to call with no improvement/worsening LE edema. I advised I will ensure Dr. Huitron does not have any further recommendations and will only call back if he does. She v/u to all.

## 2024-01-09 NOTE — TELEPHONE ENCOUNTER
Bowen called in this morning, she states her left foot,ankle and leg has been swelling for a couple of weeks now, she states it goes down some overnight but not completely. She states she takes her medication as prescribed. She is concerned that it may be something going on with her heart.      She can be reached at [phone].

## 2024-01-09 NOTE — TELEPHONE ENCOUNTER
Called spoke with patient.     HT -78  this morning 6:15 am     45 min later HT-67  Left foot/ankle and leg swelling.  is elevating-swelling down a little.  States swelling started past couple weeks.   has compression stockings her daughter gave her but doesn't wear them.    Dry weight-105  States no weight change still weighs 105    States no SOB  Can lay flat-no SOB    Watching her salt intake.  States she doesn't drink enough-drinks 1 glass of water a day.    Please call patient cell # [phone]

## 2024-05-01 NOTE — PROGRESS NOTES
[facility]      Cardiology Progress Note    Bowen Khan  1935    May 2, 2024    Primary care physician: Jorge Alberto Enriquez NP   Reason for Referral: CAD     CC: \"I have red spots on my leg\"     HPI:  The patient is 88 y.o. female with a past medical history significant for coronary artery disease s/p CABG, hypertension, atrial fibrillation, ascending aortic aneurysm, and hyperlipidemia who presents today for management of CAD and Afib. She previously followed with Dr. Morris after she presented to the ED with chest pain. She was admitted with an NSTEMI and underwent angiography that resulted in PCI prox LAD and left main x2 CARROL. She was admitted to [facility] 11/2020 and was found to be in atrial fibrillation with RVR. She spontaneously converted and was started on Eliquis 2.5 mg BID. Today, she presents for an acute visit for red spots to her LLE which started after she started wearing compression stockings. She denies any associated bleeding or pain. She is concerned and wonders if she should stop wearing her compression stockings. She also feels she is having more bruising on her arms and legs without recalling any particular trauma. She has chronic lower extremity edema that waxes and wanes, but denies any acute changes. She denies any new sounding cardiac complaints. She denies any chest pains or worsening shortness of breath. She reports medication compliance and is tolerating. She denies exertional chest pain/pressure, dyspnea at rest, worsening COLIN, PND, orthopnea, palpitations, lightheadedness, weight changes, changes in LE edema, and syncope.      Past Medical History:   Diagnosis Date    CAD (coronary artery disease)     --S/p CABG X3 2000 at . S/P NSTEMI 10/16/20. Echo 10/2020 LVEF normal, mild AI, MR. Cath-->    GERD (gastroesophageal reflux disease)     Hyperlipidemia     Hypertension     PAF (paroxysmal atrial fibrillation) ([facility])     S/P AAA repair     Thoracic aortic aneurysm

## 2024-05-02 ENCOUNTER — OFFICE VISIT (OUTPATIENT)
Dept: CARDIOLOGY CLINIC | Age: 89
End: 2024-05-02
Payer: MEDICARE

## 2024-05-02 VITALS
HEIGHT: 62 IN | SYSTOLIC BLOOD PRESSURE: 112 MMHG | HEART RATE: 56 BPM | DIASTOLIC BLOOD PRESSURE: 60 MMHG | WEIGHT: 100 LBS | BODY MASS INDEX: 18.4 KG/M2 | OXYGEN SATURATION: 97 %

## 2024-05-02 DIAGNOSIS — E78.5 HYPERLIPIDEMIA LDL GOAL <70: ICD-10-CM

## 2024-05-02 DIAGNOSIS — I71.21 ANEURYSM OF ASCENDING AORTA WITHOUT RUPTURE (HCC): ICD-10-CM

## 2024-05-02 DIAGNOSIS — I25.10 CORONARY ARTERY DISEASE INVOLVING NATIVE CORONARY ARTERY OF NATIVE HEART WITHOUT ANGINA PECTORIS: Primary | ICD-10-CM

## 2024-05-02 DIAGNOSIS — I48.0 PAF (PAROXYSMAL ATRIAL FIBRILLATION) (HCC): ICD-10-CM

## 2024-05-02 DIAGNOSIS — I10 ESSENTIAL HYPERTENSION: ICD-10-CM

## 2024-05-02 DIAGNOSIS — N18.32 STAGE 3B CHRONIC KIDNEY DISEASE (HCC): ICD-10-CM

## 2024-05-02 PROCEDURE — 1123F ACP DISCUSS/DSCN MKR DOCD: CPT | Performed by: INTERNAL MEDICINE

## 2024-05-02 PROCEDURE — 99214 OFFICE O/P EST MOD 30 MIN: CPT | Performed by: INTERNAL MEDICINE

## 2024-05-02 PROCEDURE — G8427 DOCREV CUR MEDS BY ELIG CLIN: HCPCS | Performed by: INTERNAL MEDICINE

## 2024-05-02 PROCEDURE — G8419 CALC BMI OUT NRM PARAM NOF/U: HCPCS | Performed by: INTERNAL MEDICINE

## 2024-05-02 PROCEDURE — 1090F PRES/ABSN URINE INCON ASSESS: CPT | Performed by: INTERNAL MEDICINE

## 2024-05-02 PROCEDURE — 1036F TOBACCO NON-USER: CPT | Performed by: INTERNAL MEDICINE

## 2024-05-02 RX ORDER — DIAPER,BRIEF,INFANT-TODD,DISP
EACH MISCELLANEOUS PRN
COMMUNITY

## 2024-05-09 ENCOUNTER — TELEPHONE (OUTPATIENT)
Dept: CARDIOLOGY CLINIC | Age: 89
End: 2024-05-09

## 2024-05-09 NOTE — TELEPHONE ENCOUNTER
Called spoke with patient gave lab results and states understanding. State will follow up with her PCP.

## 2024-05-09 NOTE — TELEPHONE ENCOUNTER
Bowen called in requesting her blood work results. Informed her they haven't been reviewed, and we will give her a call when they are.     Please Advise: [phone]

## 2024-06-18 ENCOUNTER — TELEPHONE (OUTPATIENT)
Dept: CARDIOLOGY CLINIC | Age: 89
End: 2024-06-18

## 2024-06-18 NOTE — TELEPHONE ENCOUNTER
Bowen called the office with complaints of feeling sick. She states that she has woken up recently feeling sick and her heart racing. She states that this feeling lasts all day, Bowen notes that she has a funny feeling across her chest, like a weight and she notes SOB. Bowen attempts to check her pulse but states that it fades. She shared that it's usually in the 50's but has been in the 70's lately.     She also notes feeling drained throughout the day. She wants to make an appt to see Tomy, she has an appt with PCP this Monday, 6/24. When asked if she had any issues to report during the call she stated that she was okay; she woke at 6:30 and took her meds at 8 per usual, Bowen is concerned with what to do.    Please advise.     Bowen's callback: [phone]

## 2024-06-19 NOTE — TELEPHONE ENCOUNTER
Appears this call was from yesterday morning. Please call patient to get an update on how she is feeling. Patient reports multiple symptoms including shortness of breath, palpitations a chest discomfort. Please use your smart phrases for these symptoms to obtain more information. Thank you.

## 2024-06-19 NOTE — TELEPHONE ENCOUNTER
I spoke with Bowen, advised of message below. She states she will be seeing her PCP on Monday. I advised her to call back with any new cardiac sounding complaints, or if PCP feels she should be seen by Dr. Huitron, we can always get her into the office anytime. She v/u.

## 2024-06-19 NOTE — TELEPHONE ENCOUNTER
Please notify patient that currently there are no further recommendations from a cardiovascular standpoint. She should follow up with her PCP - if her symptoms worsen, or she has new cardiac sounding complaints, or if PCP feels she needs a cardiology follow up, can schedule an appointment with Dr. Huitron. Thanks.

## 2024-06-19 NOTE — TELEPHONE ENCOUNTER
Dr. Fitch, please review and advise in Dr. Huitron's absence. Thank you.     Patient is reporting shortness of breath that is intermittent, but particularly in the morning when she wakes up. The shortness of breath improves with physical activity. It does not limit her physical activity or ADLs. She is able to walk her normal distance without having to stop. She is able to lay flat to sleep at night. She denies any worsening swelling or chest pain. She is currently at her dry weight. Not on oxygen. Not on any diuretics. Recently saw her PCP on 6/10/24 for sinusitis/cough and was stated on antibiotic and steroid. No history of CHF. Unsure if there is anything to do from a cardiovascular perspective, and may need to just follow up with PCP, but please advise. Thank you.

## 2024-06-19 NOTE — TELEPHONE ENCOUNTER
It sounds like she had chest pain as her angina in the past.  It sounds noncardiac in nature given that it is just present in the morning and then improves with activity.  If she has no PND orthopnea I do not think I would do anything else from a cardiac standpoint.  I think she should follow-up with her primary care provider.  If she has other symptoms that are concerning for cardiac cause then she would need a follow-up appointment in the next few weeks.

## 2024-06-19 NOTE — TELEPHONE ENCOUNTER
CHF-SOB-EDEMA-WEIGHT GAIN    What type of symptoms are you having?  SOB intermittent worse in the mornings upon waking up.    Do you have new or worsening edema (swelling)?   If so, where? Normal in left leg per pt   Do you wear compression stockings? Yes on left leg   Are you elevating your legs? no    Have you had recent weight gain?  If so how much and in what time frame?    What is your current weight? 100 lbs    What is your normal (dry) weight? 100 lbs    Do you have new or worsening short of breath?   Is your SOB only with exertion or constant? Worse in am per pt then the SOB improves after getting up and moving    How far can you walk before needing to stop? Normal distance    How long does it take to recover (return to normal breathing) at rest? Worse in am    Are you able to lay flat to sleep without becoming SOB? Yes   Are you on Oxygen?    How much? No    Have you needed to increase your O2? no   Are you monitoring your SpO2 (oxygen saturation)?  If so what is it?NA    Are you taking a diuretic (water pill)? No diuretics      Medication Name:     Dosage:    How often:   Do notice any improvement in SOB or swelling when you take the diuretic? None      How long have you been having these symptoms? Started approx 2 months but worsening with recent steroid and abx usage for Cough and congestion    Are your symptoms limiting daily activities or quality of life? In the morning yes     Are you following a low sodium and fluid restricted diet? Yes    What does you typical daily diet include?    How much fluid are you drinking? Under 64 oz     Have you had any recent hospitalizations for these symptoms?  no    Do you monitor your BP and Heart rate at home? Not currently    If so what are your recent readings?     Has there been a recent Echo? Unable to locate in chart  What is the EF? Last EF: None      Pt denies palpitations at this time. Pt unable to provide BP.   She states that she feels better in the

## 2024-07-26 NOTE — PROGRESS NOTES
thoracoabdominal aortic junction. Stable ascending thoracic aortic   aneurysm. No thoracic aortic dissection or rupture.   2.  Findings suggest mild CHF or volume overload with moderate left and smaller   right pleural effusions and mild anasarca.   -Ascending thoracic aortic aneurysm stable measuring 44 mm diameter level main   pulmonary artery.   -Fusiform aneurysmal dilatation distal descending thoracic aorta and   thoracoabdominal aortic junction slightly increased measuring 47 x 49 mm versus   46 x 48 mm previously.     Echo 6/27/24 ([facility])  Left ventricular function is normal with an estimated ejection fraction of 55%.   There is mild to moderate mitral valve regurgitation.   Left ventricular chamber dimension is normal.   Left ventricular segmental wall motion is normal.   The left ventricular diastolic function is indeterminate.   Right ventricular systolic function is normal.   Estimated pulmonary artery systolic pressure is 27 mmHg.   The apical septum, and mid anteroseptal are hypokinetic.   All other walls appear normal.     Echo Limited 7/3/24 ([facility])  There is mildly increased left ventricular wall thickness.   Left ventricular function is moderately reduced with an estimated ejection   fraction of 35-40%.   Left ventricular segmental wall motion is abnormal. Hypokinesis of the   base to apical inferoseptal and inferior wall, base to mid anteroseptal LV   wall.   Estimated pulmonary artery systolic pressure is 46 mmHg + RA pressure.   At least moderate mitral regurgitation.   There is moderate tricuspid valve regurgitation.     Echo Limited 7/5/24 ([facility])  Limited echocardiogram.   Left ventricular function is moderately reduced with an estimated ejection   fraction of 30-35%.   Left ventricular chamber dimension is normal.   There is no increased left ventricular wall thickness.   Left ventricular segmental wall motion is abnormal with hypokinesis of the   mid anteroseptal, mid inferoseptal, apical

## 2024-07-29 NOTE — PROGRESS NOTES
is present. The left atrium is normal in size.  Mild aortic regurgitation.  Normal right ventricular size and function.  Trivial tricuspid regurgitation.    CTA 10/19/20   Dilation of the ascending aorta up to 4.1 cm.  Focal aneurysm of the   descending aorta just above the diaphragm measuring 4.6 cm.     Carotid 11/8/22  A mild lesion noted in the right internal carotid artery estimated at   1-39% stenosis. A mild lesion noted in the left internal carotid artery estimated at 1-39% stenosis. The bilateral vertebral arteries are patent with antegrade flow.    CT abdomen 11/8/22   Stable ascending and descending thoracic aortic aneurysms.   Stable ectasia/aneurysm of abdominal aorta.   Stable severe celiac and SMA stenoses.   Hyperdense renal lesions mildly increased in size, most likely hyperdense   renal cysts. Recommend follow-up with renal ultrasound.   Hazy nodular opacity left lung upper lobe is likely infectious/inflammatory   change. Recommend 3 month follow-up chest CT.     Chest/Abdomen/Pelvis CTA 6/24/24 ([facility])  1. Slight increase fusiform aneurysmal dilatation distal descending thoracic   aorta and thoracoabdominal aortic junction. Stable ascending thoracic aortic   aneurysm. No thoracic aortic dissection or rupture.   2.  Findings suggest mild CHF or volume overload with moderate left and smaller right pleural effusions and mild anasarca.   -Ascending thoracic aortic aneurysm stable measuring 44 mm diameter level main pulmonary artery.   -Fusiform aneurysmal dilatation distal descending thoracic aorta and   thoracoabdominal aortic junction slightly increased measuring 47 x 49 mm versus 46 x 48 mm previously.     Echo Limited 7/5/24 ([facility])  Left ventricular function is moderately reduced with an estimated ejection   fraction of 30-35%. Left ventricular chamber dimension is normal. There is no increased left ventricular wall thickness. Left ventricular segmental wall motion is abnormal with hypokinesis of

## 2024-08-01 ENCOUNTER — TELEPHONE (OUTPATIENT)
Dept: CARDIOLOGY CLINIC | Age: 89
End: 2024-08-01

## 2024-08-01 ENCOUNTER — OFFICE VISIT (OUTPATIENT)
Dept: CARDIOLOGY CLINIC | Age: 89
End: 2024-08-01
Payer: MEDICARE

## 2024-08-01 VITALS
DIASTOLIC BLOOD PRESSURE: 64 MMHG | SYSTOLIC BLOOD PRESSURE: 132 MMHG | OXYGEN SATURATION: 94 % | HEART RATE: 91 BPM | WEIGHT: 98 LBS | BODY MASS INDEX: 18.03 KG/M2 | HEIGHT: 62 IN

## 2024-08-01 DIAGNOSIS — E78.2 MIXED HYPERLIPIDEMIA: ICD-10-CM

## 2024-08-01 DIAGNOSIS — Z95.1 S/P CABG (CORONARY ARTERY BYPASS GRAFT): ICD-10-CM

## 2024-08-01 DIAGNOSIS — I71.21 ANEURYSM OF ASCENDING AORTA WITHOUT RUPTURE (HCC): ICD-10-CM

## 2024-08-01 DIAGNOSIS — I25.10 CORONARY ARTERY DISEASE INVOLVING NATIVE CORONARY ARTERY OF NATIVE HEART WITHOUT ANGINA PECTORIS: ICD-10-CM

## 2024-08-01 DIAGNOSIS — I10 ESSENTIAL HYPERTENSION: ICD-10-CM

## 2024-08-01 DIAGNOSIS — I48.0 PAF (PAROXYSMAL ATRIAL FIBRILLATION) (HCC): ICD-10-CM

## 2024-08-01 DIAGNOSIS — I50.22 CHRONIC SYSTOLIC HEART FAILURE (HCC): Primary | ICD-10-CM

## 2024-08-01 PROCEDURE — 1090F PRES/ABSN URINE INCON ASSESS: CPT | Performed by: INTERNAL MEDICINE

## 2024-08-01 PROCEDURE — 99214 OFFICE O/P EST MOD 30 MIN: CPT | Performed by: INTERNAL MEDICINE

## 2024-08-01 PROCEDURE — 1036F TOBACCO NON-USER: CPT | Performed by: INTERNAL MEDICINE

## 2024-08-01 PROCEDURE — 1123F ACP DISCUSS/DSCN MKR DOCD: CPT | Performed by: INTERNAL MEDICINE

## 2024-08-01 PROCEDURE — 93000 ELECTROCARDIOGRAM COMPLETE: CPT | Performed by: INTERNAL MEDICINE

## 2024-08-01 PROCEDURE — G2211 COMPLEX E/M VISIT ADD ON: HCPCS | Performed by: INTERNAL MEDICINE

## 2024-08-01 PROCEDURE — G8419 CALC BMI OUT NRM PARAM NOF/U: HCPCS | Performed by: INTERNAL MEDICINE

## 2024-08-01 PROCEDURE — G8427 DOCREV CUR MEDS BY ELIG CLIN: HCPCS | Performed by: INTERNAL MEDICINE

## 2024-08-01 RX ORDER — LISINOPRIL 10 MG/1
10 TABLET ORAL DAILY
Qty: 90 TABLET | Refills: 1
Start: 2024-08-01

## 2024-08-01 RX ORDER — PSEUDOEPHEDRINE HCL 30 MG
100 TABLET ORAL 2 TIMES DAILY
COMMUNITY

## 2024-08-01 RX ORDER — HYDROCODONE BITARTRATE AND ACETAMINOPHEN 5; 325 MG/1; MG/1
1 TABLET ORAL 2 TIMES DAILY PRN
COMMUNITY
Start: 2024-07-31

## 2024-08-01 RX ORDER — NITROGLYCERIN 0.4 MG/1
0.4 TABLET SUBLINGUAL EVERY 5 MIN PRN
COMMUNITY

## 2024-08-01 RX ORDER — DIGOXIN 125 MCG
125 TABLET ORAL DAILY
COMMUNITY

## 2024-08-01 RX ORDER — FUROSEMIDE 40 MG/1
40 TABLET ORAL DAILY
COMMUNITY
Start: 2024-07-10

## 2024-08-01 RX ORDER — METOPROLOL SUCCINATE 50 MG/1
50 TABLET, EXTENDED RELEASE ORAL DAILY
Qty: 90 TABLET | Refills: 1 | Status: SHIPPED | OUTPATIENT
Start: 2024-08-01

## 2024-08-01 NOTE — TELEPHONE ENCOUNTER
Patient called with confusion on how to take her Meotprolol. Please call and go over changed medications with patient.    Bowen's callback: [phone]

## 2024-08-05 ENCOUNTER — TELEPHONE (OUTPATIENT)
Dept: CARDIOLOGY CLINIC | Age: 89
End: 2024-08-05

## 2024-08-05 DIAGNOSIS — I50.22 CHRONIC SYSTOLIC HEART FAILURE (HCC): Primary | ICD-10-CM

## 2024-08-05 RX ORDER — DIGOXIN 125 MCG
125 TABLET ORAL DAILY
Qty: 30 TABLET | Refills: 3 | Status: SHIPPED | OUTPATIENT
Start: 2024-08-05

## 2024-08-05 NOTE — TELEPHONE ENCOUNTER
Bowen's callback: [phone]    Bowen called the office wanting to report high BP after adjusting medications at last OV. She states that her BP on Sunday, 8/4 was 184/104 . This morning BP was reported as 175/104 . Both BP's are taken around 7:45 daily. When asked if there were any other cardiac issues, Bowen reported fast HR when she wakes around 4:30-5 a.m.      Please advise.

## 2024-08-05 NOTE — TELEPHONE ENCOUNTER
Leila from [facility] called in - not aware that Bowen already called on her BP - to relay BP readings:     8/2 Fri 160/103, HR 93  8/3 Sat 165/104,   8/4 Sun 184/104,   8/5 Mon (after meds) 140/60, HR 80    Leila shared the only difference in symptoms with medication change is that Bowen is waking up in the middle of the night about 4:30a with heavy breathing.     Bowen is unable to come into office for BP check but relayed to continue monitoring BP and give a call back into the office on Thursday with readings/update.     Bowen's callback: [phone]

## 2024-08-05 NOTE — TELEPHONE ENCOUNTER
Have patient come in for a BP check if able, and bring in her home monitor to assess for accuracy. Looks like BP was well controlled at OV 8/1/24. If transportation if an issue, have her to continue to monitor and call back Thursday with an update. Thanks.

## 2024-08-05 NOTE — TELEPHONE ENCOUNTER
Called and spoke with patient.   Blood Pressure Problems:    Or too high - Hypertension (High BP)?  Running high    What are your BP readings within the last week?    7/31/24  148/97  94  8/3/24  165/101   103  8/4/24  184/104  101  8/5/24  175/104  100    What blood pressure medications are you taking and when?    Metoprolol 50 mg QD  Lisinopril 10 mg QD    What symptoms are you experiencing?  Headaches? no    Dizziness? (All the time or with standing/changing positions?) no    Have you passed out? no    Chest Pain? no    Difficulty breathing? No more usual     Blurred vision? no    Anxiety? Always has    Are you switching positions slowly? yes    Are you spacing out your medications? Taking all her medications in morning.    Please review all medications - there are other medications that could be contributing besides just BP medications (example, pain medications).    What other medications are you taking?   Digoxin 125 mg QD  Plavix 75 mg QD  Atorvastatin 20 mg QD  Furosemide 40 mg QD  Ativan 1 mg every evening    Patient states she will need refill of Digoxin 125 mcg sent into her [facility].

## 2024-08-07 RX ORDER — LISINOPRIL 10 MG/1
20 TABLET ORAL DAILY
Qty: 90 TABLET | Refills: 1
Start: 2024-08-07 | End: 2024-08-09 | Stop reason: SDUPTHER

## 2024-08-07 NOTE — TELEPHONE ENCOUNTER
Bowen called in to relay BP readings:     8/5 Monday 7:30AM - 168/106 HR 97.  - then took her metoprolol 50 MG & lisinopril 10 MG   10:00 AM - 145/91 HR 91   8:00 PM (bedtime) - 180/105     8/7 Wednesday 8:00 AM - 158/117     10:00 AM - 139/85 HR 92    Bowen thinks her BP was better controlled when she was taking metoprolol 2x/day and that her BP seems to fluctuate in the morning and at night but seems stable midday. She is also concerned about waking up in the middle of the early morning (4:30 am,5:30 am) breathing heavy.     Bowen's callback: [phone]

## 2024-08-07 NOTE — TELEPHONE ENCOUNTER
I spoke with Bowen, she states ever since her OV last week, her BP has been running high. She states she has always monitored her BP at home and it has been well controlled. She states her BP is the most elevated in the evenings. She states prior to her OV with Dr. Huitron, she was taking Toprol XL 50 mg BID, now she is only taking it daily. She feels this is why her BP is now elevated. Looking at the OV note, and history on medication list, I do not ever see it prescribed BID, but she is confident that is the dose she was taking. She also reports a vague sensation of \"breathing heavy\" around 4-5 a.m. She states it resolves within a few hours of getting up. She is able to lay flat to sleep at night. She denies any COLIN, swelling and weight has been stable. I advised I will send a message to Dr. Huitron, and call back with his recommendations. She v/u.     Patient is currently taking Toprol XL 50 mg daily and lisinopril 10 mg daily. Has repeat BMP/dig level ordered to complete prior to next visit (8/29/24). Kidney function stable (7/31/24).

## 2024-08-07 NOTE — TELEPHONE ENCOUNTER
I spoke with Bowen, advised to increase lisinopril to 20 mg daily, continue to log BP and call me in a week with an update. She v/u.

## 2024-08-08 RX ORDER — FUROSEMIDE 40 MG/1
40 TABLET ORAL DAILY
Qty: 90 TABLET | Refills: 3 | Status: SHIPPED | OUTPATIENT
Start: 2024-08-08

## 2024-08-08 NOTE — TELEPHONE ENCOUNTER
Last OV: 8/1/24  Next OV: 8/29/24  Last refill:   Most recent Labs: 7/31/24  Last EKG (if needed): 8/1/24

## 2024-08-08 NOTE — TELEPHONE ENCOUNTER
Medication Refill    When was your last appointment with cardiology?  8/1/24  (If 1 yr or longer, please schedule appointment)    (If patient has been told they do not need to follow-up - medications should be filled by PCP)    When did you last have labs drawn? 7/31/24    Medication needing refilled? furosemide (LASIX)     Dosage of the medication? 40 MG tablet     How are you taking this medication (QD, BID, TID, QID, PRN)?  Take 1 tablet by mouth daily     Do you want a 30 or 90 day supply? 90    When will you run out of your medication?      Which Pharmacy are we sending this medication to? [facility] [id number] - [place] [id number]     Pharmacy Phone: [phone]   Pharmacy Fax: [phone]

## 2024-08-09 RX ORDER — LISINOPRIL 40 MG/1
40 TABLET ORAL DAILY
Qty: 1 TABLET | Refills: 1 | Status: SHIPPED | OUTPATIENT
Start: 2024-08-09

## 2024-08-09 NOTE — TELEPHONE ENCOUNTER
Called patient and relayed message and instructions from Nydia OWENS.  Patient verbalized and confirmed understanding.

## 2024-08-09 NOTE — TELEPHONE ENCOUNTER
Called spoke with patient, states she did increase her lisinopril 20 mg.    8/8/24  7:30 am  172/96  P-104  8/9/24 9:15 am  170/94  P-85             2:00 pm   176/85     Patient states when she was taking metoprolol 50 mg twice a day she felt her blood pressure was more controlled.     Patient states some shortness of breath and feels when she gets up moving around her heart rate is fast.   No dizziness, light headed, or faint feeling and chest pain.    Please advise.

## 2024-08-09 NOTE — TELEPHONE ENCOUNTER
Please call pt.  This RN Attempted to call pt.  Each time pt picked up phone then hung up.  Please try pt again.  Advise her to increase her lisinopril to 40 mg daily and have repeat blood work in 2 weeks.  Order for blood work placed and new rx for lisinopril sent to [facility].

## 2024-08-09 NOTE — TELEPHONE ENCOUNTER
Bowen called to rprot her BP is not leveling out and she is concerned. She would like to know if it's any new recommendations for her?       Please Advise: [phone]